# Patient Record
Sex: FEMALE | Race: BLACK OR AFRICAN AMERICAN | URBAN - METROPOLITAN AREA
[De-identification: names, ages, dates, MRNs, and addresses within clinical notes are randomized per-mention and may not be internally consistent; named-entity substitution may affect disease eponyms.]

---

## 2020-10-20 ENCOUNTER — OFFICE VISIT (OUTPATIENT)
Dept: HEMATOLOGY | Age: 60
End: 2020-10-20
Payer: COMMERCIAL

## 2020-10-20 VITALS
SYSTOLIC BLOOD PRESSURE: 135 MMHG | OXYGEN SATURATION: 98 % | HEART RATE: 82 BPM | BODY MASS INDEX: 37.8 KG/M2 | HEIGHT: 68 IN | DIASTOLIC BLOOD PRESSURE: 83 MMHG | RESPIRATION RATE: 16 BRPM | WEIGHT: 249.4 LBS | TEMPERATURE: 97.3 F

## 2020-10-20 DIAGNOSIS — E66.01 SEVERE OBESITY (HCC): ICD-10-CM

## 2020-10-20 DIAGNOSIS — R74.8 ABNORMAL LIVER ENZYMES: Primary | ICD-10-CM

## 2020-10-20 PROBLEM — E28.2 PCOS (POLYCYSTIC OVARIAN SYNDROME): Status: ACTIVE | Noted: 2020-10-20

## 2020-10-20 PROBLEM — M54.16 LUMBAR RADICULOPATHY: Status: ACTIVE | Noted: 2020-10-20

## 2020-10-20 PROBLEM — Z90.49 HISTORY OF APPENDECTOMY: Status: ACTIVE | Noted: 2020-10-20

## 2020-10-20 PROBLEM — I89.0 LYMPHATIC EDEMA: Status: ACTIVE | Noted: 2020-10-20

## 2020-10-20 PROBLEM — E66.9 OBESITY: Status: ACTIVE | Noted: 2020-10-20

## 2020-10-20 PROBLEM — J30.9 ALLERGIC RHINITIS: Status: ACTIVE | Noted: 2020-10-20

## 2020-10-20 PROBLEM — I10 ESSENTIAL HYPERTENSION: Status: ACTIVE | Noted: 2020-10-20

## 2020-10-20 PROBLEM — Z98.84 H/O GASTRIC BYPASS: Status: ACTIVE | Noted: 2020-10-20

## 2020-10-20 LAB — FERRITIN SERPL-MCNC: 49 NG/ML (ref 8–252)

## 2020-10-20 PROCEDURE — 99204 OFFICE O/P NEW MOD 45 MIN: CPT | Performed by: HOSPITALIST

## 2020-10-20 RX ORDER — DICLOFENAC SODIUM 50 MG/1
TABLET, DELAYED RELEASE ORAL
COMMUNITY

## 2020-10-20 RX ORDER — FLUTICASONE PROPIONATE 50 MCG
SPRAY, SUSPENSION (ML) NASAL
COMMUNITY

## 2020-10-20 RX ORDER — GLIMEPIRIDE 4 MG/1
TABLET ORAL
COMMUNITY

## 2020-10-20 RX ORDER — METFORMIN HYDROCHLORIDE 500 MG/1
TABLET, FILM COATED, EXTENDED RELEASE ORAL
COMMUNITY

## 2020-10-20 RX ORDER — FUROSEMIDE 20 MG/1
20 TABLET ORAL DAILY
COMMUNITY
Start: 2020-08-31

## 2020-10-20 RX ORDER — LABETALOL 100 MG/1
TABLET, FILM COATED ORAL
COMMUNITY
Start: 2020-08-03

## 2020-10-20 RX ORDER — EMPAGLIFLOZIN AND LINAGLIPTIN 10; 5 MG/1; MG/1
TABLET, FILM COATED ORAL
COMMUNITY
Start: 2020-08-31

## 2020-10-20 RX ORDER — GABAPENTIN 100 MG/1
CAPSULE ORAL
COMMUNITY
Start: 2020-10-13

## 2020-10-20 NOTE — PROGRESS NOTES
Identified pt with two pt identifiers(name and ). Reviewed record in preparation for visit and have obtained necessary documentation. Chief Complaint   Patient presents with    New Patient    Elevated Liver Enzymes      Vitals:    10/20/20 1254   BP: 135/83   Pulse: 82   Resp: 16   Temp: 97.3 °F (36.3 °C)   TempSrc: Temporal   SpO2: 98%   Weight: 249 lb 6.4 oz (113.1 kg)   Height: 5' 8\" (1.727 m)   PainSc:   5   PainLoc: Neck       Health Maintenance Review: Patient reminded of \"due or due soon\" health maintenance. I have asked the patient to contact his/her primary care provider (PCP) for follow-up on his/her health maintenance. Coordination of Care Questionnaire:  :   1) Have you been to an emergency room, urgent care, or hospitalized since your last visit? If yes, where when, and reason for visit? no       2. Have seen or consulted any other health care provider since your last visit? If yes, where when, and reason for visit? NO      Patient is accompanied by self I have received verbal consent from Reggy Showers to discuss any/all medical information while they are present in the room.

## 2020-10-20 NOTE — LETTER
10/26/20 Patient: Ann Sanchez YOB: 1960 Date of Visit: 10/20/2020 Dolores Peña MD 
[facility] [address] [address] VIA Facsimile: [phone] Neelima Vang MD 
[address] [place] [address] Drive [address] VIA In Basket Dear MD Neelima Ryder MD, Thank you for referring Ms. Annika Trivedi to [address] for evaluation. My notes for this consultation are attached. If you have questions, please do not hesitate to call me. I look forward to following your patient along with you. Sincerely, Jaye Virk MD

## 2020-10-20 NOTE — PROGRESS NOTES
Kasandra Everettr [address]      Re Cortez MD, Ebony Zepeda, Domenico Art MD, MPH      BRIDGET Gipson-RUBIA Islas, ACN-BC     Myah Aguilera, [facility]   Mel Huddleston, FNP-C    Merrillmoyas Alvarez, [facility]       Antionette [address]    at [address], [facility] Frankie Sol  22.    [phone]    FAX: [address] [place] [address], [address]    [phone]    FAX: [phone]       Patient Care Team:  Mirlande Dumont MD as PCP - General (Family Medicine)      Problem List  Date Reviewed: 10/20/2020          Codes Class Noted    Obesity ICD-10-CM: E66.9  ICD-9-CM: 278.00  10/20/2020        Allergic rhinitis ICD-10-CM: J30.9  ICD-9-CM: 477.9  10/20/2020        PCOS (polycystic ovarian syndrome) ICD-10-CM: E28.2  ICD-9-CM: 256.4  10/20/2020        Type II diabetes mellitus, uncontrolled ([address] ICD-10-CM: E11.65  ICD-9-CM: 250.02  10/20/2020        H/O gastric bypass ICD-10-CM: Z98.84  ICD-9-CM: V45.86  10/20/2020        History of appendectomy ICD-10-CM: Z90.49  ICD-9-CM: V45.89  10/20/2020        Essential hypertension ICD-10-CM: I10  ICD-9-CM: 401.9  10/20/2020        Abnormal liver enzymes ICD-10-CM: R74.8  ICD-9-CM: 790.5  10/20/2020        Lymphatic edema ICD-10-CM: I89.0  ICD-9-CM: 457.1  10/20/2020              The clinicians listed above have asked me to see Reggy Showers in consultation regarding elevated liver enzymes and its management. All medical records sent by the referring physicians were reviewed including imaging studies     The patient is a 63 y.o. black female who was told she had elevated liver enzymes several years ago as per patient. She states liver enzymes have intermittently remained elevated.   LFT's performed in 09/2020 showed AST 50, , ALP 92,  Total bili 0.6, Albumin 3.6, Total protein 7.1,     Serologic evaluation for markers of chronic liver disease was negative for acute hepatitis panel. Liver ultrasound performed in 09/2020 but result showed diffuse abnormal echotexture suugestive of fatty infiltration      An assessment of liver fibrosis with biopsy or elastography has not been performed. The patient had not started any new medications within 3 months preceding the elevation in liver chemistries. She reports she is on tumeric, magnesium and omega H3 fish oil. Patient is diabetic. Most recent HbA1c was 7.7    Patient currently reports fatigue    The patient is not currently experiencing  shortness of breath, pain in the right side over the liver, diffuse abdominal pain, yellowing of the eyes or skin, problems concentrating,   swelling of the abdomen, swelling of the lower extremities, hematemesis, and hematochezia. The patient completes all daily activities without any functional limitations. ASSESSMENT AND PLAN:  Elevated liver enzymes  Persistent elevation in liver transaminases of unclear etiology at this time. Liver transaminases are elevated. Liver function is normal.  The platelet count is normal.    Based upon laboratory studies and imaging, the patient does not appear to have advanced liver diease or cirrhosis. Will perform additional serologic tests to screen for other causes of chronic liver disease. The most likely causes for the liver chemistry abnormalities were discussed with the patient and include fatty liver disease,   The need to perform an assessment of liver fibrosis was discussed with the patient. The Fibroscan can assess liver fibrosis and determine if a patient has advanced fibrosis or cirrhosis without the need for liver biopsy. This will be performed at the next office visit.     If the liver enzymes remain persistently elevated without explanation over 1-2 years then a liver biopsy should be considered. NAFLD  Suspect the patient has fatty liver based upon imaging, features of metabolic syndrome, serologic studies that are negative for other causes of chronic liver disease,   Only a liver biopsy can confirm if the patient does have fatty liver and differentiate NAFL from SOSA. The treatment is the same; weight reduction. If the liver biopsy demonstrates SOSA the patient could be eligible for enrollment into clinical trials for treatment of SOSA. If the liver enzymes remain persistently elevated over the next 1-2 years a liver biopsy should be performed to ensure there is no ongoing chronic liver disease. If the patient looses 20% of current body weight, which is 50 pounds, down to a weight of 199 pounds, all steatosis will have resolved. Once all steatosis has resolved all inflammation will resolve. Then all fibrosis will gradually resolve and the liver could eventually be normal.  There is currently no FDA approved medical treatment for fatty liver, NALFD or SOSA. The only medical treatments for SOSA are though clinical trials. Counseling for diet and weight loss in patients with confirmed or suspected NAFLD  The patient was counseled regarding diet and exercise to achieve weight loss. The best diet for patients with fatty liver is the mediterranean diet. The patient was told not to consume any food products and drinks containing fructose as this enhances hepatic fat synthesis. Screening for Hepatocellular Carcinoma  HCC screening is not necessary if the patient has no evidence of cirrhosis. Treatment of other medical problems in patients with chronic liver disease  There are no contraindications for the patient to take most medications that are necessary for treatment of other medical issues.     Counseling for alcohol in patients with chronic liver disease  The patient was counseled regarding alcohol consumption and the effect of alcohol on chronic liver disease. The patient has cirrhosis and was advised to be abstinent from all alcohol including non-alcoholic beer which does contain some alcohol. Vaccinations   The need for vaccination against viral hepatitis A and B will be assessed with serologic and instituted as appropriate. Routine vaccinations against other bacterial and viral agents can be performed as indicated. Annual flu vaccination should be administered if indicated. ALLERGIES  Allergies   Allergen Reactions    Shellfish Derived Anaphylaxis       MEDICATIONS  Current Outpatient Medications   Medication Sig    metFORMIN (GLUMETZA ER) 500 mg TG24 24 hour tablet metformin  mg tablet,extended release 24 hr    Glyxambi 10-5 mg tab     fluticasone propionate (FLONASE) 50 mcg/actuation nasal spray fluticasone propionate 50 mcg/actuation nasal spray,suspension    furosemide (LASIX) 20 mg tablet 20 mg daily.  glimepiride (AMARYL) 4 mg tablet glimepiride 4 mg tablet    labetaloL (NORMODYNE) 100 mg tablet TAKE 1 TABLET BY MOUTH EVERY DAY    gabapentin (NEURONTIN) 100 mg capsule     diclofenac EC (VOLTAREN) 50 mg EC tablet diclofenac sodium 50 mg tablet,delayed release     No current facility-administered medications for this visit. SYSTEM REVIEW NOT RELATED TO LIVER DISEASE OR REVIEWED ABOVE:  Constitution systems: Negative for fever, chills, weight gain, weight loss. Eyes: Negative for visual changes. ENT: Negative for sore throat, painful swallowing. Respiratory: Negative for cough, hemoptysis, SOB. Cardiology: Negative for chest pain, palpitations. GI:  Negative for constipation or diarrhea. : Negative for urinary frequency, dysuria, hematuria, nocturia. Skin: Negative for rash. Hematology: Negative for easy bruising, blood clots. Musculo-skelatal: Negative for back pain, muscle pain, weakness.   Neurologic: Negative for headaches, dizziness, vertigo, memory problems not related to HE.  Psychology: Negative for anxiety, depression. FAMILY HISTORY:  The father   Has/had the following following chronic disease(s): Multiple Myeloma  The mother   Has/had the following chronic disease(s): CHF, CAD, Afib  Brother  of liver cancer. .  There is no family history of immune disorders. SOCIAL HISTORY:  The patient is . The patient has no children. The patient has never used tobacco products. The patient consumes alcohol on social occasions never in excess. The patient currently works full time as  with [employer]. PHYSICAL EXAMINATION:  Visit Vitals  /83 (BP 1 Location: Right arm, BP Patient Position: Sitting)   Pulse 82   Temp 97.3 °F (36.3 °C) (Temporal)   Resp 16   Ht 5' 8\" (1.727 m)   Wt 113.1 kg (249 lb 6.4 oz)   SpO2 98%   BMI 37.92 kg/m²     General: No acute distress. Eyes: Sclera anicteric. ENT: No oral lesions. Thyroid normal.  Nodes: No adenopathy. Skin: No spider angiomata. No jaundice. No palmar erythema. Respiratory: Lungs clear to auscultation. Cardiovascular: Regular heart rate. No murmurs. No JVD. Abdomen: Soft non-tender. Liver size normal to percussion/palpation. Spleen not palpable. No obvious ascites. Extremities: No edema. No muscle wasting. No gross arthritic changes. Neurologic: Alert and oriented. Cranial nerves grossly intact. No asterixis. LABORATORY STUDIES:  Recent liver function panel, CBC with platelet count and BMP are not available. These studies will be performed. 2020  AST 50,  ,   ALP 92,    Total bili 0.6, Albumin 3.6, Total protein 7.1,     SEROLOGIES:  Not available or performed. Testing was performed today.   Serologies Latest Ref Rng & Units 10/20/2020   Hep A Ab, Total Negative   Negative   Hep B Core Ab, Total Negative   Negative   Hep B Surface Ab mIU/mL <3.10   Hep B Surface Ab Interp NONREACTIVE   NONREACTIVE   Ferritin 8 - 252 NG/ML 49   Iron % Saturation 20 - 50 % 29   ASMCA 0 - 19 Units 6   Alpha-1 antitrypsin level 101 - 187 mg/dL 117       LIVER HISTOLOGY:  Not available or performed    ENDOSCOPIC PROCEDURES:  Not available or performed    RADIOLOGY:  09/20: Liver US: Diffuse abnormal echotexture suggestive of fatty infiltration    OTHER TESTING:  Not available or performed    FOLLOW-UP:  All of the issues listed above in the Assessment and Plan were discussed with the patient. All questions were answered. The patient expressed a clear understanding of the above. [address] [place] [address] in 4 weeks for Fibroscan and to review all data and determine the treatment plan.     Floyd Howard MD, MPH  Advanced Hepatology  [facility] of [address] [address] Kenn Lieberman, [address] [place] [address] 22.  [phone]  [address] [place] [address]

## 2020-10-21 LAB
HAV AB SER QL IA: NEGATIVE
HBV CORE AB SERPL QL IA: NEGATIVE
HBV SURFACE AB SER QL: NONREACTIVE
HBV SURFACE AB SER-ACNC: <3.1 MIU/ML
IRON SATN MFR SERPL: 29 % (ref 20–50)
IRON SERPL-MCNC: 103 UG/DL (ref 35–150)
TIBC SERPL-MCNC: 357 UG/DL (ref 250–450)

## 2020-10-22 LAB
A1AT SERPL-MCNC: 117 MG/DL (ref 101–187)
ACTIN IGG SERPL-ACNC: 6 UNITS (ref 0–19)

## 2020-10-25 PROBLEM — E66.01 SEVERE OBESITY (HCC): Status: ACTIVE | Noted: 2020-10-25

## 2020-10-25 LAB — ANA TITR SER IF: NEGATIVE {TITER}

## 2022-03-18 PROBLEM — I89.0 LYMPHATIC EDEMA: Status: ACTIVE | Noted: 2020-10-20

## 2022-03-19 PROBLEM — Z98.84 H/O GASTRIC BYPASS: Status: ACTIVE | Noted: 2020-10-20

## 2022-03-19 PROBLEM — E66.9 OBESITY: Status: ACTIVE | Noted: 2020-10-20

## 2022-03-19 PROBLEM — I10 ESSENTIAL HYPERTENSION: Status: ACTIVE | Noted: 2020-10-20

## 2022-03-19 PROBLEM — E28.2 PCOS (POLYCYSTIC OVARIAN SYNDROME): Status: ACTIVE | Noted: 2020-10-20

## 2022-03-19 PROBLEM — Z90.49 HISTORY OF APPENDECTOMY: Status: ACTIVE | Noted: 2020-10-20

## 2022-03-19 PROBLEM — E66.01 SEVERE OBESITY (HCC): Status: ACTIVE | Noted: 2020-10-25

## 2022-03-19 PROBLEM — J30.9 ALLERGIC RHINITIS: Status: ACTIVE | Noted: 2020-10-20

## 2022-03-20 PROBLEM — R74.8 ABNORMAL LIVER ENZYMES: Status: ACTIVE | Noted: 2020-10-20

## 2022-05-19 NOTE — PATIENT INSTRUCTIONS
We will perform blood work today We will bring you back in 4 weeks for fibroscan Recommend weight loss at least 50 lbs. Return to clinic in 4 weeks Learning About the [address] Diet What is the Mediterranean diet? The Mediterranean diet is a style of eating rather than a diet plan. It features foods eaten in [place], [place], [place] and [place], and other countries along the [facility]. It emphasizes eating foods like fish, fruits, vegetables, beans, high-fiber breads and whole grains, nuts, and olive oil. This style of eating includes limited red meat, cheese, and sweets. Why choose the Mediterranean diet? A Mediterranean-style diet may improve heart health. It contains more fat than other heart-healthy diets. But the fats are mainly from nuts, unsaturated oils (such as fish oils and olive oil), and certain nut or seed oils (such as canola, soybean, or flaxseed oil). These fats may help protect the heart and blood vessels. How can you get started on the Mediterranean diet? Here are some things you can do to switch to a more Mediterranean way of eating. What to eat · Eat a variety of fruits and vegetables each day, such as grapes, blueberries, tomatoes, broccoli, peppers, figs, olives, spinach, eggplant, beans, lentils, and chickpeas. · Eat a variety of whole-grain foods each day, such as oats, brown rice, and whole wheat bread, pasta, and couscous. · Eat fish at least 2 times a week. Try tuna, salmon, mackerel, lake trout, herring, or sardines. · Eat moderate amounts of low-fat dairy products, such as milk, cheese, or yogurt. · Eat moderate amounts of poultry and eggs. · Choose healthy (unsaturated) fats, such as nuts, olive oil, and certain nut or seed oils like canola, soybean, and flaxseed. · Limit unhealthy (saturated) fats, such as butter, palm oil, and coconut oil.  And limit fats found in animal products, such as meat and dairy products made with whole milk. Try to eat red meat only a few times a month in very small amounts. · Limit sweets and desserts to only a few times a week. This includes sugar-sweetened drinks like soda. The Mediterranean diet may also include red wine with your meal1 glass each day for women and up to 2 glasses a day for men. Tips for eating at home · Use herbs, spices, garlic, lemon zest, and citrus juice instead of salt to add flavor to foods. · Add avocado slices to your sandwich instead of krueger. · Have fish for lunch or dinner instead of red meat. Brush the fish with olive oil, and broil or grill it. · Sprinkle your salad with seeds or nuts instead of cheese. · Cook with olive or canola oil instead of butter or oils that are high in saturated fat. · Switch from 2% milk or whole milk to 1% or fat-free milk. · Dip raw vegetables in a vinaigrette dressing or hummus instead of dips made from mayonnaise or sour cream. 
· Have a piece of fruit for dessert instead of a piece of cake. Try baked apples, or have some dried fruit. Tips for eating out · Try broiled, grilled, baked, or poached fish instead of having it fried or breaded. · Ask your  to have your meals prepared with olive oil instead of butter. · Order dishes made with marinara sauce or sauces made from olive oil. Avoid sauces made from cream or mayonnaise. · Choose whole-grain breads, whole wheat pasta and pizza crust, brown rice, beans, and lentils. · Cut back on butter or margarine on bread. Instead, you can dip your bread in a small amount of olive oil. · Ask for a side salad or grilled vegetables instead of french fries or chips. Where can you learn more? Go to http://www.gray.com/ Enter 199-678-7664 in the search box to learn more about \"Learning About the Mediterranean Diet. \" Current as of: August 22, 2019               Content Version: 12.6 © 5651-7850 [employer]. Care instructions adapted under license by [employer] (which disclaims liability or warranty for this information). If you have questions about a medical condition or this instruction, always ask your healthcare professional. [address] any warranty or liability for your use of this information. 19-May-2022 14:03

## 2023-05-06 ENCOUNTER — HOSPITAL ENCOUNTER (EMERGENCY)
Facility: HOSPITAL | Age: 63
Discharge: HOME | End: 2023-05-06
Attending: STUDENT IN AN ORGANIZED HEALTH CARE EDUCATION/TRAINING PROGRAM
Payer: COMMERCIAL

## 2023-05-06 ENCOUNTER — APPOINTMENT (OUTPATIENT)
Facility: HOSPITAL | Age: 63
End: 2023-05-06
Payer: COMMERCIAL

## 2023-05-06 VITALS
HEART RATE: 68 BPM | DIASTOLIC BLOOD PRESSURE: 89 MMHG | RESPIRATION RATE: 18 BRPM | HEIGHT: 68 IN | WEIGHT: 223 LBS | TEMPERATURE: 98.7 F | BODY MASS INDEX: 33.8 KG/M2 | OXYGEN SATURATION: 100 % | SYSTOLIC BLOOD PRESSURE: 147 MMHG

## 2023-05-06 DIAGNOSIS — M25.531 RIGHT WRIST PAIN: Primary | ICD-10-CM

## 2023-05-06 PROCEDURE — 73110 X-RAY EXAM OF WRIST: CPT

## 2023-05-06 PROCEDURE — 99283 EMERGENCY DEPT VISIT LOW MDM: CPT

## 2023-05-06 PROCEDURE — 29125 APPL SHORT ARM SPLINT STATIC: CPT

## 2023-05-06 ASSESSMENT — LIFESTYLE VARIABLES
HOW OFTEN DO YOU HAVE A DRINK CONTAINING ALCOHOL: NEVER
HOW MANY STANDARD DRINKS CONTAINING ALCOHOL DO YOU HAVE ON A TYPICAL DAY: PATIENT DOES NOT DRINK
HOW OFTEN DO YOU HAVE A DRINK CONTAINING ALCOHOL: NEVER

## 2023-05-06 ASSESSMENT — PAIN SCALES - GENERAL: PAINLEVEL_OUTOF10: 7

## 2023-05-06 NOTE — ED PROVIDER NOTES
[facility] EMERGENCY DEPARTMENT  EMERGENCY DEPARTMENT HISTORY AND PHYSICAL EXAM      Date: 5/6/2023  Patient Name: Benjamín Iglesias  MRN: [id number]  YOB: 1960  Date of evaluation: 5/6/2023  Provider: Darvin Parks MD   Note Started: 8:40 AM 5/6/23    HISTORY OF PRESENT ILLNESS     Chief Complaint   Patient presents with    Wrist Injury    Hip Pain       History Provided By: Patient    HPI: Benjamín Iglesias, 60 y.o. female presenting to the ED for right wrist pain. Patient states about 8 days ago she was getting out of her car and walking along a curb when she slipped and fell and injured her right wrist.  She believes she hit an outstretched hand but is unsure of the mechanism exactly. She notes she has been delayed come to the ED because she thought the pain would improve but it has continued. She notes pain in the right wrist and extending to the right thumb. She denies any numbness or tingling. She does report some pain to the hip since the injury but she reports it is improved and her main reason for coming to the ED today is the wrist.     PAST MEDICAL HISTORY   Past Medical History:  History reviewed. No pertinent past medical history. Past Surgical History:  History reviewed. No pertinent surgical history. Family History:  History reviewed. No pertinent family history. Social History:  Social History     Tobacco Use    Smoking status: Never    Smokeless tobacco: Never   Substance Use Topics    Alcohol use: Not Currently    Drug use: Never       Allergies:  No Known Allergies    PCP: Anika Amaya MD    Current Meds:   Previous Medications    No medications on file       REVIEW OF SYSTEMS     Positives and Pertinent negatives as per HPI. PHYSICAL EXAM   [unfilled]   Physical Exam  Vitals and nursing note reviewed. HENT:      Nose: Nose normal.      Mouth/Throat:      Mouth: Mucous membranes are moist.   Cardiovascular:      Pulses: Normal pulses.    Pulmonary:

## 2023-05-30 RX ORDER — EMPAGLIFLOZIN AND LINAGLIPTIN 10; 5 MG/1; MG/1
TABLET, FILM COATED ORAL
COMMUNITY
Start: 2020-08-31

## 2023-05-30 RX ORDER — LABETALOL 100 MG/1
1 TABLET, FILM COATED ORAL DAILY
COMMUNITY
Start: 2020-08-03

## 2023-05-30 RX ORDER — GLIMEPIRIDE 4 MG/1
TABLET ORAL
COMMUNITY

## 2023-05-30 RX ORDER — GABAPENTIN 100 MG/1
CAPSULE ORAL
COMMUNITY
Start: 2020-10-13

## 2023-05-30 RX ORDER — FLUTICASONE PROPIONATE 50 MCG
SPRAY, SUSPENSION (ML) NASAL
COMMUNITY

## 2023-05-30 RX ORDER — METFORMIN HYDROCHLORIDE 500 MG/1
TABLET, FILM COATED, EXTENDED RELEASE ORAL
COMMUNITY

## 2023-05-30 RX ORDER — FUROSEMIDE 20 MG/1
TABLET ORAL DAILY
COMMUNITY
Start: 2020-08-31

## 2023-10-05 ENCOUNTER — HOSPITAL ENCOUNTER (EMERGENCY)
Facility: HOSPITAL | Age: 63
Discharge: HOME | End: 2023-10-05
Payer: COMMERCIAL

## 2023-10-05 VITALS
OXYGEN SATURATION: 96 % | HEART RATE: 85 BPM | TEMPERATURE: 98.2 F | DIASTOLIC BLOOD PRESSURE: 81 MMHG | HEIGHT: 68 IN | WEIGHT: 240 LBS | RESPIRATION RATE: 17 BRPM | SYSTOLIC BLOOD PRESSURE: 146 MMHG | BODY MASS INDEX: 36.37 KG/M2

## 2023-10-05 DIAGNOSIS — R73.09 ELEVATED RANDOM BLOOD GLUCOSE LEVEL: ICD-10-CM

## 2023-10-05 DIAGNOSIS — N30.01 ACUTE CYSTITIS WITH HEMATURIA: ICD-10-CM

## 2023-10-05 DIAGNOSIS — R60.0 BILATERAL LOWER EXTREMITY EDEMA: Primary | ICD-10-CM

## 2023-10-05 LAB
ALBUMIN SERPL-MCNC: 3.1 G/DL (ref 3.5–5)
ALBUMIN/GLOB SERPL: 0.9 (ref 1.1–2.2)
ALP SERPL-CCNC: 125 U/L (ref 45–117)
ALT SERPL-CCNC: 23 U/L (ref 12–78)
ANION GAP SERPL CALC-SCNC: 7 MMOL/L (ref 5–15)
APPEARANCE UR: ABNORMAL
AST SERPL W P-5'-P-CCNC: 14 U/L (ref 15–37)
BACTERIA URNS QL MICRO: ABNORMAL /HPF
BASOPHILS # BLD: 0 K/UL (ref 0–0.1)
BASOPHILS NFR BLD: 0 % (ref 0–1)
BILIRUB SERPL-MCNC: 0.5 MG/DL (ref 0.2–1)
BILIRUB UR QL: NEGATIVE
BNP SERPL-MCNC: 27 PG/ML
BUN SERPL-MCNC: 12 MG/DL (ref 6–20)
BUN/CREAT SERPL: 18 (ref 12–20)
CA-I BLD-MCNC: 8.7 MG/DL (ref 8.5–10.1)
CHLORIDE SERPL-SCNC: 102 MMOL/L (ref 97–108)
CO2 SERPL-SCNC: 29 MMOL/L (ref 21–32)
COLOR UR: YELLOW
CREAT SERPL-MCNC: 0.65 MG/DL (ref 0.55–1.02)
DIFFERENTIAL METHOD BLD: ABNORMAL
EOSINOPHIL # BLD: 0.2 K/UL (ref 0–0.4)
EOSINOPHIL NFR BLD: 4 % (ref 0–7)
EPITH CASTS URNS QL MICRO: ABNORMAL /LPF
ERYTHROCYTE [DISTWIDTH] IN BLOOD BY AUTOMATED COUNT: 13.4 % (ref 11.5–14.5)
GLOBULIN SER CALC-MCNC: 3.4 G/DL (ref 2–4)
GLUCOSE SERPL-MCNC: 303 MG/DL (ref 65–100)
GLUCOSE UR STRIP.AUTO-MCNC: >1000 MG/DL
HCT VFR BLD AUTO: 40.5 % (ref 35–47)
HGB BLD-MCNC: 12.5 G/DL (ref 11.5–16)
HGB UR QL STRIP: ABNORMAL
IMM GRANULOCYTES # BLD AUTO: 0 K/UL (ref 0–0.04)
IMM GRANULOCYTES NFR BLD AUTO: 0 % (ref 0–0.5)
KETONES UR QL STRIP.AUTO: NEGATIVE MG/DL
LEUKOCYTE ESTERASE UR QL STRIP.AUTO: ABNORMAL
LYMPHOCYTES # BLD: 1.3 K/UL (ref 0.8–3.5)
LYMPHOCYTES NFR BLD: 23 % (ref 12–49)
MCH RBC QN AUTO: 25.8 PG (ref 26–34)
MCHC RBC AUTO-ENTMCNC: 30.9 G/DL (ref 30–36.5)
MCV RBC AUTO: 83.7 FL (ref 80–99)
MONOCYTES # BLD: 0.6 K/UL (ref 0–1)
MONOCYTES NFR BLD: 10 % (ref 5–13)
NEUTS SEG # BLD: 3.4 K/UL (ref 1.8–8)
NEUTS SEG NFR BLD: 63 % (ref 32–75)
NITRITE UR QL STRIP.AUTO: POSITIVE
PH UR STRIP: 5 (ref 5–8)
PLATELET # BLD AUTO: 192 K/UL (ref 150–400)
PMV BLD AUTO: 10.3 FL (ref 8.9–12.9)
POTASSIUM SERPL-SCNC: 3.9 MMOL/L (ref 3.5–5.1)
PROT SERPL-MCNC: 6.5 G/DL (ref 6.4–8.2)
PROT UR STRIP-MCNC: NEGATIVE MG/DL
RBC # BLD AUTO: 4.84 M/UL (ref 3.8–5.2)
RBC #/AREA URNS HPF: ABNORMAL /HPF (ref 0–5)
SODIUM SERPL-SCNC: 138 MMOL/L (ref 136–145)
SP GR UR REFRACTOMETRY: 1.02 (ref 1–1.03)
URINE CULTURE IF INDICATED: ABNORMAL
UROBILINOGEN UR QL STRIP.AUTO: 0.1 EU/DL (ref 0.2–1)
WBC # BLD AUTO: 5.5 K/UL (ref 3.6–11)
WBC URNS QL MICRO: ABNORMAL /HPF (ref 0–4)

## 2023-10-05 PROCEDURE — 87077 CULTURE AEROBIC IDENTIFY: CPT

## 2023-10-05 PROCEDURE — 36415 COLL VENOUS BLD VENIPUNCTURE: CPT

## 2023-10-05 PROCEDURE — 80053 COMPREHEN METABOLIC PANEL: CPT

## 2023-10-05 PROCEDURE — 85025 COMPLETE CBC W/AUTO DIFF WBC: CPT

## 2023-10-05 PROCEDURE — 99283 EMERGENCY DEPT VISIT LOW MDM: CPT

## 2023-10-05 PROCEDURE — 87186 SC STD MICRODIL/AGAR DIL: CPT

## 2023-10-05 PROCEDURE — 81001 URINALYSIS AUTO W/SCOPE: CPT

## 2023-10-05 PROCEDURE — 83880 ASSAY OF NATRIURETIC PEPTIDE: CPT

## 2023-10-05 PROCEDURE — 87086 URINE CULTURE/COLONY COUNT: CPT

## 2023-10-05 RX ORDER — CEPHALEXIN 500 MG/1
500 CAPSULE ORAL 4 TIMES DAILY
Qty: 28 CAPSULE | Refills: 0 | Status: SHIPPED | OUTPATIENT
Start: 2023-10-05 | End: 2023-10-12

## 2023-10-05 RX ORDER — EMPAGLIFLOZIN AND LINAGLIPTIN 10; 5 MG/1; MG/1
10 TABLET, FILM COATED ORAL DAILY
Qty: 30 TABLET | Refills: 0 | Status: SHIPPED | OUTPATIENT
Start: 2023-10-05

## 2023-10-05 ASSESSMENT — PAIN SCALES - GENERAL: PAINLEVEL_OUTOF10: 8

## 2023-10-05 ASSESSMENT — LIFESTYLE VARIABLES
HOW MANY STANDARD DRINKS CONTAINING ALCOHOL DO YOU HAVE ON A TYPICAL DAY: PATIENT DOES NOT DRINK
HOW OFTEN DO YOU HAVE A DRINK CONTAINING ALCOHOL: NEVER

## 2023-10-05 ASSESSMENT — PAIN - FUNCTIONAL ASSESSMENT: PAIN_FUNCTIONAL_ASSESSMENT: 0-10

## 2023-10-05 NOTE — ED PROVIDER NOTES
Judgment: Judgment normal.           SCREENINGS                  LAB, EKG AND DIAGNOSTIC RESULTS   Labs:  Recent Results (from the past 12 hour(s))   CMP    Collection Time: 10/05/23  4:15 PM   Result Value Ref Range    Sodium 138 136 - 145 mmol/L    Potassium 3.9 3.5 - 5.1 mmol/L    Chloride 102 97 - 108 mmol/L    CO2 29 21 - 32 mmol/L    Anion Gap 7 5 - 15 mmol/L    Glucose 303 (H) 65 - 100 mg/dL    BUN 12 6 - 20 mg/dL    Creatinine 0.65 0.55 - 1.02 mg/dL    Bun/Cre Ratio 18 12 - 20      Est, Glom Filt Rate >60 >60 ml/min/1.73m2    Calcium 8.7 8.5 - 10.1 mg/dL    Total Bilirubin 0.5 0.2 - 1.0 mg/dL    AST 14 (L) 15 - 37 U/L    ALT 23 12 - 78 U/L    Alk Phosphatase 125 (H) 45 - 117 U/L    Total Protein 6.5 6.4 - 8.2 g/dL    Albumin 3.1 (L) 3.5 - 5.0 g/dL    Globulin 3.4 2.0 - 4.0 g/dL    Albumin/Globulin Ratio 0.9 (L) 1.1 - 2.2     CBC with Auto Differential    Collection Time: 10/05/23  4:15 PM   Result Value Ref Range    WBC 5.5 3.6 - 11.0 K/uL    RBC 4.84 3.80 - 5.20 M/uL    Hemoglobin 12.5 11.5 - 16.0 g/dL    Hematocrit 40.5 35.0 - 47.0 %    MCV 83.7 80.0 - 99.0 FL    MCH 25.8 (L) 26.0 - 34.0 PG    MCHC 30.9 30.0 - 36.5 g/dL    RDW 13.4 11.5 - 14.5 %    Platelets 905 828 - 848 K/uL    MPV 10.3 8.9 - 12.9 FL    Neutrophils % 63 32 - 75 %    Lymphocytes % 23 12 - 49 %    Monocytes % 10 5 - 13 %    Eosinophils % 4 0 - 7 %    Basophils % 0 0 - 1 %    Immature Granulocytes 0 0.0 - 0.5 %    Neutrophils Absolute 3.4 1.8 - 8.0 K/UL    Lymphocytes Absolute 1.3 0.8 - 3.5 K/UL    Monocytes Absolute 0.6 0.0 - 1.0 K/UL    Eosinophils Absolute 0.2 0.0 - 0.4 K/UL    Basophils Absolute 0.0 0.0 - 0.1 K/UL    Absolute Immature Granulocyte 0.0 0.00 - 0.04 K/UL    Differential Type AUTOMATED     Brain Natriuretic Peptide    Collection Time: 10/05/23  4:15 PM   Result Value Ref Range    NT Pro-BNP 27 <125 pg/mL   Urinalysis with Reflex to Culture    Collection Time: 10/05/23  4:45 PM    Specimen: Urine   Result Value Ref Range

## 2023-10-08 LAB
BACTERIA SPEC CULT: ABNORMAL
COLONY COUNT, CNT: ABNORMAL
Lab: ABNORMAL

## 2023-10-23 LAB — HBA1C MFR BLD HPLC: 9 %

## 2024-01-29 ENCOUNTER — OFFICE VISIT (OUTPATIENT)
Facility: CLINIC | Age: 64
End: 2024-01-29
Payer: COMMERCIAL

## 2024-01-29 VITALS
RESPIRATION RATE: 20 BRPM | SYSTOLIC BLOOD PRESSURE: 138 MMHG | TEMPERATURE: 97.7 F | BODY MASS INDEX: 35.46 KG/M2 | WEIGHT: 234 LBS | HEART RATE: 73 BPM | DIASTOLIC BLOOD PRESSURE: 83 MMHG | HEIGHT: 68 IN | OXYGEN SATURATION: 97 %

## 2024-01-29 DIAGNOSIS — E66.01 CLASS 2 SEVERE OBESITY DUE TO EXCESS CALORIES WITH SERIOUS COMORBIDITY AND BODY MASS INDEX (BMI) OF 35.0 TO 35.9 IN ADULT (HCC): ICD-10-CM

## 2024-01-29 DIAGNOSIS — E11.65 CONTROLLED TYPE 2 DIABETES MELLITUS WITH HYPERGLYCEMIA, WITHOUT LONG-TERM CURRENT USE OF INSULIN (HCC): ICD-10-CM

## 2024-01-29 DIAGNOSIS — Z12.31 BREAST CANCER SCREENING BY MAMMOGRAM: ICD-10-CM

## 2024-01-29 DIAGNOSIS — Z01.419 WELL WOMAN EXAM: ICD-10-CM

## 2024-01-29 DIAGNOSIS — I10 ESSENTIAL HYPERTENSION: Primary | ICD-10-CM

## 2024-01-29 DIAGNOSIS — K75.81 STEATOHEPATITIS: ICD-10-CM

## 2024-01-29 PROBLEM — R74.8 ABNORMAL LIVER ENZYMES: Status: RESOLVED | Noted: 2020-10-20 | Resolved: 2024-01-29

## 2024-01-29 PROBLEM — E66.812 CLASS 2 SEVERE OBESITY DUE TO EXCESS CALORIES WITH SERIOUS COMORBIDITY AND BODY MASS INDEX (BMI) OF 35.0 TO 35.9 IN ADULT: Status: ACTIVE | Noted: 2020-10-25

## 2024-01-29 PROBLEM — E66.9 OBESITY: Status: RESOLVED | Noted: 2020-10-20 | Resolved: 2024-01-29

## 2024-01-29 PROCEDURE — 99204 OFFICE O/P NEW MOD 45 MIN: CPT | Performed by: FAMILY MEDICINE

## 2024-01-29 PROCEDURE — 3075F SYST BP GE 130 - 139MM HG: CPT | Performed by: FAMILY MEDICINE

## 2024-01-29 PROCEDURE — 3079F DIAST BP 80-89 MM HG: CPT | Performed by: FAMILY MEDICINE

## 2024-01-29 ASSESSMENT — PATIENT HEALTH QUESTIONNAIRE - PHQ9
SUM OF ALL RESPONSES TO PHQ9 QUESTIONS 1 & 2: 0
SUM OF ALL RESPONSES TO PHQ QUESTIONS 1-9: 0
SUM OF ALL RESPONSES TO PHQ QUESTIONS 1-9: 0
1. LITTLE INTEREST OR PLEASURE IN DOING THINGS: 0
2. FEELING DOWN, DEPRESSED OR HOPELESS: 0
SUM OF ALL RESPONSES TO PHQ QUESTIONS 1-9: 0
SUM OF ALL RESPONSES TO PHQ QUESTIONS 1-9: 0

## 2024-01-29 NOTE — PROGRESS NOTES
Family Medicine Initial Office Visit  Patient: Brandie Ahumada  1960, 63 y.o., female  Encounter Date: 1/29/2024    ASSESSMENT & PLAN  Brandie Ahumada is a 63 y.o. female who presented for established care with below concerns:    1. Essential hypertension  Overview:  BP Readings from Last 3 Encounters:   01/29/24 138/83   10/05/23 (!) 146/81   05/06/23 (!) 147/89     Medication: none  Prev Meds: labetalol, losartan, lisinopril (all caused lightheadedness despite low dosage)    Interval Hx:  - doing well, hasn't needed medications.  Assessment & Plan:   Borderline controlled, continue current treatment plan and lifestyle modifications recommended  Orders:  -     CBC; Future  -     Comprehensive Metabolic Panel; Future  2. Controlled type 2 diabetes mellitus with hyperglycemia, without long-term current use of insulin (HCC)  Overview:  No results found for: \"LABA1C\"    Medication: glimepiride 4mg daily, berberine supplement 1200mg BID, Jardiance 25mg daily (needs refill)    Fasting glucose: 165 most recently    Interval Hx:  - doing well, needs Jardiance refill.  - weakness dietary is breads.     Assessment & Plan:   Borderline controlled, changes made today: restart jardiance, watch for low glucose readings as she continues to improve lifestyle measurements. Follow up in 3 months.  Orders:  -     empagliflozin (JARDIANCE) 25 MG tablet; Take 1 tablet by mouth daily, Disp-90 tablet, R-3Normal  -     Hemoglobin A1C; Future  -     Microalbumin / Creatinine Urine Ratio; Future  3. Steatohepatitis  Overview:  Lab Results   Component Value Date/Time    ALKPHOS 125 10/05/2023 04:15 PM    ALT 23 10/05/2023 04:15 PM    AST 14 10/05/2023 04:15 PM    PROT 6.5 10/05/2023 04:15 PM    BILITOT 0.5 10/05/2023 04:15 PM    LABALBU 3.1 10/05/2023 04:15 PM     US confirmed.    Interval Hx:  -working hard on lifestyle management.  Assessment & Plan:   Unclear control, lifestyle modifications recommended  Orders:  -     Lipid

## 2024-01-29 NOTE — ASSESSMENT & PLAN NOTE
Borderline controlled, changes made today: restart jardiance, watch for low glucose readings as she continues to improve lifestyle measurements. Follow up in 3 months.

## 2024-01-31 ENCOUNTER — TELEPHONE (OUTPATIENT)
Facility: CLINIC | Age: 64
End: 2024-01-31

## 2024-02-05 NOTE — PROGRESS NOTES
Brandie Ahumada is a 63 y.o. female returns for an annual exam     No chief complaint on file.      No LMP recorded.  Her periods are absent due to menopause  She does not have dysmenorrhea.  Problems: no problems  Birth Control: post menopausal status.  Last Pap: 2019 and was normal per pt; reports never having abnormal pap smear  She does not have a history of FAMILIA 2, 3 or cervical cancer.   Last Mammogram: 10/5/2023 negative  Last colonoscopy: 11/17/2020 was normal      Examination chaperoned by Radha Fulton LPN.

## 2024-02-12 ENCOUNTER — OFFICE VISIT (OUTPATIENT)
Age: 64
End: 2024-02-12
Payer: COMMERCIAL

## 2024-02-12 VITALS — DIASTOLIC BLOOD PRESSURE: 79 MMHG | SYSTOLIC BLOOD PRESSURE: 144 MMHG | WEIGHT: 234.4 LBS | BODY MASS INDEX: 35.64 KG/M2

## 2024-02-12 DIAGNOSIS — Z01.419 ENCOUNTER FOR GYNECOLOGICAL EXAMINATION: Primary | ICD-10-CM

## 2024-02-12 DIAGNOSIS — Z11.51 SCREENING FOR HUMAN PAPILLOMAVIRUS (HPV): ICD-10-CM

## 2024-02-12 PROCEDURE — 3077F SYST BP >= 140 MM HG: CPT | Performed by: OBSTETRICS & GYNECOLOGY

## 2024-02-12 PROCEDURE — 3078F DIAST BP <80 MM HG: CPT | Performed by: OBSTETRICS & GYNECOLOGY

## 2024-02-12 PROCEDURE — 99386 PREV VISIT NEW AGE 40-64: CPT | Performed by: OBSTETRICS & GYNECOLOGY

## 2024-02-12 RX ORDER — MULTIVIT-MIN/IRON/FOLIC ACID/K 18-600-40
CAPSULE ORAL
COMMUNITY

## 2024-02-12 RX ORDER — MAGNESIUM 30 MG
30 TABLET ORAL 2 TIMES DAILY
COMMUNITY

## 2024-02-12 RX ORDER — CYANOCOBALAMIN (VITAMIN B-12) 500 MCG
TABLET ORAL
COMMUNITY

## 2024-02-12 NOTE — PROGRESS NOTES
Brandie Ahumada is a ,  63 y.o. female Black /  whose LMP was on  who presents for her annual checkup. She is having no problems.    Menstrual status:      Pt is postmenopausal    The patient is not using HRT.    Contraception:    The current method of family planning is post menopausal status.    Sexual history:    She  reports being sexually active and has had partner(s) who are male. She reports using the following method of birth control/protection: Post-menopausal.        Pap and Mammogram History:    Last Pap:  and was normal per pt; reports never having abnormal pap smear  She does not have a history of FAMILIA 2, 3 or cervical cancer.   Last Mammogram: 10/5/2023 negative  Last colonoscopy: 2020 was normal      Breast Cancer History    She has no family history of breast cancer.    Family History   Problem Relation Age of Onset    Heart Failure Mother     Diabetes Mother     Cancer Mother         Cervical    Heart Surgery Mother     Cancer Father     Diabetes Maternal Grandmother     Diabetes Paternal Grandmother     Diabetes Sister        Past Medical History:   Diagnosis Date    Acquired lymphedema of lower extremity     Diabetes mellitus (HCC)     Type 2 diabetes mellitus without complication (HCC) >30 years     Past Surgical History:   Procedure Laterality Date    APPENDECTOMY      DILATION AND CURETTAGE      Excessive bleeding    GASTRIC BYPASS SURGERY      KNEE SURGERY      LAPAROSCOPY       Current Outpatient Medications   Medication Sig Dispense Refill    magnesium 30 MG tablet Take 1 tablet by mouth 2 times daily      Omega-3 Fatty Acids (FISH OIL) 300 MG CAPS Take by mouth      Cholecalciferol (VITAMIN D) 50 MCG (2000) CAPS capsule Take by mouth      Berberine Chloride (BERBERINE HCI PO) Take by mouth      glimepiride (AMARYL) 4 MG tablet       empagliflozin (JARDIANCE) 25 MG tablet Take 1 tablet by mouth daily (Patient not taking: Reported on 2024) 90

## 2024-02-21 LAB
CYTOLOGIST CVX/VAG CYTO: ABNORMAL
CYTOLOGY CVX/VAG DOC CYTO: ABNORMAL
CYTOLOGY CVX/VAG DOC THIN PREP: ABNORMAL
DX ICD CODE: ABNORMAL
HPV GENOTYPE REFLEX: ABNORMAL
HPV I/H RISK 4 DNA CVX QL PROBE+SIG AMP: POSITIVE
HPV16 DNA CVX QL PROBE+SIG AMP: NEGATIVE
HPV18+45 E6+E7 MRNA CVX QL NAA+PROBE: NEGATIVE
Lab: ABNORMAL
Lab: ABNORMAL
OTHER STN SPEC: ABNORMAL
STAT OF ADQ CVX/VAG CYTO-IMP: ABNORMAL

## 2024-02-29 ENCOUNTER — PATIENT MESSAGE (OUTPATIENT)
Facility: CLINIC | Age: 64
End: 2024-02-29

## 2024-03-01 NOTE — TELEPHONE ENCOUNTER
From: Brandie Ahumada  To: Dr. Akil Boggs  Sent: 2/29/2024 9:03 PM EST  Subject: Jardiance too expensive >$1.4k    FYI  Controlling glucose with berberine supplement and glimepiride only.  Found effective product prescription not required. Thank you. Elena Ahumada

## 2024-04-23 DIAGNOSIS — I10 ESSENTIAL HYPERTENSION: ICD-10-CM

## 2024-04-23 DIAGNOSIS — K75.81 STEATOHEPATITIS: ICD-10-CM

## 2024-04-23 DIAGNOSIS — E11.65 CONTROLLED TYPE 2 DIABETES MELLITUS WITH HYPERGLYCEMIA, WITHOUT LONG-TERM CURRENT USE OF INSULIN (HCC): ICD-10-CM

## 2024-04-24 LAB
ALBUMIN SERPL-MCNC: 3.1 G/DL (ref 3.5–5)
ALBUMIN/GLOB SERPL: 1 (ref 1.1–2.2)
ALP SERPL-CCNC: 120 U/L (ref 45–117)
ALT SERPL-CCNC: 24 U/L (ref 12–78)
ANION GAP SERPL CALC-SCNC: 4 MMOL/L (ref 5–15)
AST SERPL-CCNC: 15 U/L (ref 15–37)
BILIRUB SERPL-MCNC: 0.4 MG/DL (ref 0.2–1)
BUN SERPL-MCNC: 12 MG/DL (ref 6–20)
BUN/CREAT SERPL: 18 (ref 12–20)
CALCIUM SERPL-MCNC: 8.9 MG/DL (ref 8.5–10.1)
CHLORIDE SERPL-SCNC: 108 MMOL/L (ref 97–108)
CHOLEST SERPL-MCNC: 135 MG/DL
CO2 SERPL-SCNC: 28 MMOL/L (ref 21–32)
CREAT SERPL-MCNC: 0.68 MG/DL (ref 0.55–1.02)
CREAT UR-MCNC: 162 MG/DL
ERYTHROCYTE [DISTWIDTH] IN BLOOD BY AUTOMATED COUNT: 14.6 % (ref 11.5–14.5)
EST. AVERAGE GLUCOSE BLD GHB EST-MCNC: 163 MG/DL
GLOBULIN SER CALC-MCNC: 3.1 G/DL (ref 2–4)
GLUCOSE SERPL-MCNC: 183 MG/DL (ref 65–100)
HBA1C MFR BLD: 7.3 % (ref 4–5.6)
HCT VFR BLD AUTO: 36.8 % (ref 35–47)
HDLC SERPL-MCNC: 61 MG/DL
HDLC SERPL: 2.2 (ref 0–5)
HGB BLD-MCNC: 11.7 G/DL (ref 11.5–16)
LDLC SERPL CALC-MCNC: 54.6 MG/DL (ref 0–100)
MCH RBC QN AUTO: 25.7 PG (ref 26–34)
MCHC RBC AUTO-ENTMCNC: 31.8 G/DL (ref 30–36.5)
MCV RBC AUTO: 80.7 FL (ref 80–99)
MICROALBUMIN UR-MCNC: 1.19 MG/DL
MICROALBUMIN/CREAT UR-RTO: 7 MG/G (ref 0–30)
NRBC # BLD: 0 K/UL (ref 0–0.01)
NRBC BLD-RTO: 0 PER 100 WBC
PLATELET # BLD AUTO: 230 K/UL (ref 150–400)
PMV BLD AUTO: 11.4 FL (ref 8.9–12.9)
POTASSIUM SERPL-SCNC: 4.1 MMOL/L (ref 3.5–5.1)
PROT SERPL-MCNC: 6.2 G/DL (ref 6.4–8.2)
RBC # BLD AUTO: 4.56 M/UL (ref 3.8–5.2)
SODIUM SERPL-SCNC: 140 MMOL/L (ref 136–145)
TRIGL SERPL-MCNC: 97 MG/DL
VLDLC SERPL CALC-MCNC: 19.4 MG/DL
WBC # BLD AUTO: 5.6 K/UL (ref 3.6–11)

## 2024-04-30 ENCOUNTER — OFFICE VISIT (OUTPATIENT)
Facility: CLINIC | Age: 64
End: 2024-04-30
Payer: COMMERCIAL

## 2024-04-30 VITALS
TEMPERATURE: 97.6 F | RESPIRATION RATE: 20 BRPM | OXYGEN SATURATION: 97 % | SYSTOLIC BLOOD PRESSURE: 144 MMHG | HEIGHT: 68 IN | WEIGHT: 240 LBS | BODY MASS INDEX: 36.37 KG/M2 | DIASTOLIC BLOOD PRESSURE: 77 MMHG | HEART RATE: 89 BPM

## 2024-04-30 DIAGNOSIS — E88.09 HYPOALBUMINEMIA: Primary | ICD-10-CM

## 2024-04-30 DIAGNOSIS — E66.01 CLASS 2 SEVERE OBESITY DUE TO EXCESS CALORIES WITH SERIOUS COMORBIDITY AND BODY MASS INDEX (BMI) OF 35.0 TO 35.9 IN ADULT (HCC): ICD-10-CM

## 2024-04-30 DIAGNOSIS — G89.29 CHRONIC RIGHT SHOULDER PAIN: ICD-10-CM

## 2024-04-30 DIAGNOSIS — E11.65 CONTROLLED TYPE 2 DIABETES MELLITUS WITH HYPERGLYCEMIA, WITHOUT LONG-TERM CURRENT USE OF INSULIN (HCC): ICD-10-CM

## 2024-04-30 DIAGNOSIS — M25.511 CHRONIC RIGHT SHOULDER PAIN: ICD-10-CM

## 2024-04-30 DIAGNOSIS — I89.0 LYMPHATIC EDEMA: ICD-10-CM

## 2024-04-30 PROCEDURE — 3051F HG A1C>EQUAL 7.0%<8.0%: CPT | Performed by: FAMILY MEDICINE

## 2024-04-30 PROCEDURE — 99215 OFFICE O/P EST HI 40 MIN: CPT | Performed by: FAMILY MEDICINE

## 2024-04-30 PROCEDURE — 3077F SYST BP >= 140 MM HG: CPT | Performed by: FAMILY MEDICINE

## 2024-04-30 PROCEDURE — 3078F DIAST BP <80 MM HG: CPT | Performed by: FAMILY MEDICINE

## 2024-04-30 RX ORDER — GLIMEPIRIDE 4 MG/1
4 TABLET ORAL
Qty: 90 TABLET | Refills: 3 | Status: SHIPPED | OUTPATIENT
Start: 2024-04-30

## 2024-04-30 RX ORDER — GLIMEPIRIDE 4 MG/1
TABLET ORAL
Qty: 30 TABLET | Refills: 3 | Status: CANCELLED | OUTPATIENT
Start: 2024-04-30

## 2024-04-30 NOTE — PROGRESS NOTES
Assessment & Plan  1. Mild hypoalbuminemia.and lymphedema  The patient is advised to incorporate more protein into her diet. Get into lymph edema clinic.    2. Chronic right shoulder pain.  The patient's symptoms are indicative of severe rotator cuff tendinitis with impingement of multiple rotator cuff muscles. Home conservative treatment will be implemented, with the patient being more cognizant of activities, including decreased repetitive motions. Protective measures include rest, ice application, compression, and elevation. A handout detailing exercises will be provided to the patient. An x-ray of the right shoulder will be obtained. Go to physical therapy.    3. Diabetes.  The patient's A1c levels have decreased from 9 to 7.3. The patient's glimepiride prescription will be refilled. She is advised to continue her berberine supplements. Work with [facility]. A follow-up A1c test will be conducted in 3 months.    Follow-up  The patient is scheduled for a follow-up visit in 3 months for an annual physical and chronic shoulder pain.  It was a pleasure seeing Ms. Brandie Ahumada today.    History of Present Illness  The patient is a 63-year-old female who is here to follow up and talk about labs and she needs some refills.    The patient's A1c levels have decreased from 9 to 6.5 since 03/2024. She acknowledges inadequate protein intake, despite not being a vegetarian, and incorporating more vegetables into her diet. She supplements her diet with berberine 1200 mg daily. Her fasting blood glucose levels have been fluctuating, with a reading of 207 this morning and 137 yesterday. She has exhausted her supply of glimepiride and observed a deterioration in her blood sugar levels. Her naturopath recommended daily blood sugar monitoring. She adheres to a low-carbohydrate diet.    The patient was diagnosed with lymphedema during her childhood and has expressed concerns about her lymphatic system. She reports intermittent

## 2024-06-18 ENCOUNTER — HOSPITAL ENCOUNTER (OUTPATIENT)
Facility: HOSPITAL | Age: 64
Discharge: HOME | End: 2024-06-21
Payer: COMMERCIAL

## 2024-06-18 DIAGNOSIS — M25.511 CHRONIC RIGHT SHOULDER PAIN: ICD-10-CM

## 2024-06-18 DIAGNOSIS — G89.29 CHRONIC RIGHT SHOULDER PAIN: ICD-10-CM

## 2024-06-18 PROCEDURE — 73030 X-RAY EXAM OF SHOULDER: CPT

## 2024-07-03 ENCOUNTER — PATIENT MESSAGE (OUTPATIENT)
Facility: CLINIC | Age: 64
End: 2024-07-03

## 2024-07-03 DIAGNOSIS — E11.65 CONTROLLED TYPE 2 DIABETES MELLITUS WITH HYPERGLYCEMIA, WITHOUT LONG-TERM CURRENT USE OF INSULIN (HCC): ICD-10-CM

## 2024-07-05 NOTE — TELEPHONE ENCOUNTER
Akil Boggs MD 7/5/2024 3:51 PM EDT    Please gather more info from patient, what is \"too high\" and see if this is something that can wait till next visit or needs adjustment now. Thank you  ----- Message -----  From: Charbel Martinez LPN  Sent: 7/5/2024 8:42 AM EDT  To: Akil Boggs MD  Subject: FW: Blood glucose levels too high       ----- Message -----  From: Brandie Ahumada \"Elena\"  Sent: 7/3/2024 9:00 PM EDT  To: *  Subject: Blood glucose levels too high     Not sure why glucose levels not stable .levels too high may need to increase medicine

## 2024-07-08 RX ORDER — GLIMEPIRIDE 4 MG/1
4 TABLET ORAL
Qty: 180 TABLET | Refills: 3 | Status: SHIPPED | OUTPATIENT
Start: 2024-07-08

## 2024-07-25 ENCOUNTER — PATIENT MESSAGE (OUTPATIENT)
Facility: CLINIC | Age: 64
End: 2024-07-25

## 2024-07-25 DIAGNOSIS — M25.511 CHRONIC RIGHT SHOULDER PAIN: Primary | ICD-10-CM

## 2024-07-25 DIAGNOSIS — G89.29 CHRONIC RIGHT SHOULDER PAIN: Primary | ICD-10-CM

## 2024-07-29 NOTE — TELEPHONE ENCOUNTER
From: Brandie Ahumada  To: Dr. Akil Boggs  Sent: 7/25/2024 1:55 PM EDT  Subject: Physical therapy shoulder    I need a referral for physical therapy for [place] [address]  My apmnt is July 31,2024    10am    Please note your referral is in [place] 1 hour plus from my home in [place]. Please reply to advise.

## 2024-07-30 DIAGNOSIS — E88.09 HYPOALBUMINEMIA: ICD-10-CM

## 2024-07-30 DIAGNOSIS — E11.65 CONTROLLED TYPE 2 DIABETES MELLITUS WITH HYPERGLYCEMIA, WITHOUT LONG-TERM CURRENT USE OF INSULIN (HCC): ICD-10-CM

## 2024-07-31 ENCOUNTER — HOSPITAL ENCOUNTER (OUTPATIENT)
Facility: HOSPITAL | Age: 64
Setting detail: RECURRING SERIES
Discharge: HOME | End: 2024-08-03
Payer: COMMERCIAL

## 2024-07-31 PROCEDURE — 97161 PT EVAL LOW COMPLEX 20 MIN: CPT

## 2024-07-31 PROCEDURE — 97110 THERAPEUTIC EXERCISES: CPT

## 2024-07-31 NOTE — THERAPY EVALUATION
[facility] [address], [address]  [place] [address]  Ph: [phone]     Fax: [phone]          PHYSICAL THERAPY - EVALUATION/PLAN OF CARE NOTE (updated 3/23)      Date: 2024          Patient Name:  Brandie Ahumada :  1960   Medical   Diagnosis:  Chronic right shoulder pain [M25.511, G89.29] Treatment Diagnosis:  M25.511  RIGHT SHOULDER PAIN    Referral Source:  Akil Boggs MD Provider #:  [id number]                Insurance: Payor: [facility] / Plan: [facility] [place] / Product Type: *No Product type* /      Patient  verified yes     Visit #   Current  / Total 1 16   Time   In / Out 1043am 1137am   Total Treatment Time 54   Total Timed Codes 10         SUBJECTIVE  Pain Level (0-10 scale): mild pain at rest (2/10), severe 7-8/10 with movement  []constant [x]intermittent []improving [x]worsening []no change since onset  Pain improves with : ice, icy-hot cream, heat, sitting upright  Worsens with : lying down, reaching overhead    Any medication changes, allergies to medications, adverse drug reactions, diagnosis change, or new procedure performed?: [x] No    [] Yes (see summary sheet for update)  Medications: Verified on Patient Summary List    Subjective functional status/changes:     Pt reports ongoing R shoulder pain for the past 2-3 months. Pain is especially bad at night and the pt uses ice to help her sleep. She is having trouble lifting her arm overhead. Does not remember any particular injury but she did move at the end of last year and was doing a lot of strenuous lifting. She believes her symptoms are worsening vs improving. She has had an x-ray which was WNL. States her PCP has told her she may have a rotator cuff tear or a frozen shoulder.    Start of Care: 2024  Onset Date: ~2024, possibly earlier  Current symptoms/Complaints: pain, inability to reach overhead  Mechanism of Injury: unknown  PLOF: independent with

## 2024-08-06 ENCOUNTER — HOSPITAL ENCOUNTER (OUTPATIENT)
Facility: HOSPITAL | Age: 64
Setting detail: RECURRING SERIES
Discharge: HOME | End: 2024-08-09
Payer: COMMERCIAL

## 2024-08-06 PROCEDURE — G0283 ELEC STIM OTHER THAN WOUND: HCPCS

## 2024-08-06 PROCEDURE — 97110 THERAPEUTIC EXERCISES: CPT

## 2024-08-06 NOTE — PROGRESS NOTES
PHYSICAL THERAPY - MEDICARE DAILY TREATMENT NOTE (updated 3/23)      Date: 2024          Patient Name:  Brandie Ahumada :  1960   Medical   Diagnosis:  Chronic right shoulder pain [M25.511, G89.29] Treatment Diagnosis:  M25.511 RIGHT SHOULDER PAIN    Referral Source:  Akil Boggs MD Insurance:   Payor: [facility] / Plan: [facility] / Product Type: *No Product type* /                     Patient  verified yes     Visit #   Current  / Total 2 16   Time   In / Out 10:15 am 11:10   Total Treatment Time 53 min   Total Timed Codes 38 min   1:1 Treatment Time 38min      [facility] Totals Reminder:  bill using total billable   min of TIMED therapeutic procedures and modalities.   8-22 min = 1 unit; 23-37 min = 2 units; 38-52 min = 3 units; 53-67 min = 4 units; 68-82 min = 5 units            SUBJECTIVE    Pain Level (0-10 scale): 3/10    Any medication changes, allergies to medications, adverse drug reactions, diagnosis change, or new procedure performed?: [x] No    [] Yes (see summary sheet for update)  Medications: Verified on Patient Summary List    Subjective functional status/changes:     Patient arrived 15 min late for appointment due to traffic. Reports pain to R shoulder. Patient states she uses hp during the day and a cp shoulder sleeve at night.       OBJECTIVE      Therapeutic Procedures:  Tx Min Billable or 1:1 Min (if diff from Tx Min) Procedure, Rationale, Specifics   38  81155 Therapeutic Exercise (timed):  increase ROM, strength, coordination, balance, and proprioception to improve patient's ability to progress to PLOF and address remaining functional goals. (see flow sheet as applicable)     Details if applicable:       83532 Manual Therapy (timed):  decrease pain, increase ROM, increase tissue extensibility, and decrease trigger points to improve patient's ability to progress to PLOF and address remaining functional goals.  The manual therapy interventions were performed at a separate and

## 2024-08-13 ENCOUNTER — OFFICE VISIT (OUTPATIENT)
Facility: CLINIC | Age: 64
End: 2024-08-13

## 2024-08-13 VITALS
HEART RATE: 75 BPM | DIASTOLIC BLOOD PRESSURE: 85 MMHG | BODY MASS INDEX: 37.89 KG/M2 | TEMPERATURE: 98.1 F | HEIGHT: 68 IN | OXYGEN SATURATION: 98 % | RESPIRATION RATE: 16 BRPM | SYSTOLIC BLOOD PRESSURE: 136 MMHG | WEIGHT: 250 LBS

## 2024-08-13 DIAGNOSIS — M25.511 CHRONIC RIGHT SHOULDER PAIN: ICD-10-CM

## 2024-08-13 DIAGNOSIS — G89.29 CHRONIC RIGHT SHOULDER PAIN: ICD-10-CM

## 2024-08-13 DIAGNOSIS — I10 ESSENTIAL HYPERTENSION: ICD-10-CM

## 2024-08-13 DIAGNOSIS — E11.65 CONTROLLED TYPE 2 DIABETES MELLITUS WITH HYPERGLYCEMIA, WITHOUT LONG-TERM CURRENT USE OF INSULIN (HCC): ICD-10-CM

## 2024-08-13 DIAGNOSIS — Z00.01 ENCOUNTER FOR WELL ADULT EXAM WITH ABNORMAL FINDINGS: Primary | ICD-10-CM

## 2024-08-13 NOTE — PROGRESS NOTES
Chief Complaint   Patient presents with    Annual Exam     Annual pe per insurance for credits.  Blood sugars was 180 this am and yesterday.  [facility] is no thtat great due t pain in shoulders which she goes to pt which does help some. Still hurts but does have increase in ROM.  Bilat leg pain as well.  Dx lymphedema as a child.  Plans on retiring this year.      Diabetes       
1-dose 60+ series) Never done    COVID-19 Vaccine (1 - 2023-24 season) Never done    Flu vaccine (1) Never done      Recommendations for Preventive Services Due: see orders and patient instructions/AVS.    The patient (or guardian, if applicable) and other individuals in attendance with the patient were advised that Artificial Intelligence will be utilized during this visit to record and process the conversation to generate a clinical note. The patient (or guardian, if applicable) and other individuals in attendance at the appointment consented to the use of AI, including the recording.

## 2024-08-13 NOTE — ASSESSMENT & PLAN NOTE
Improving, continue efforts. Send me a message when starts new plan to see if still wants GLP 1. Discussed risks/benefits. Also talked about continuous glucose monitor. Foot exam completed.

## 2024-08-13 NOTE — PATIENT INSTRUCTIONS
your skin from too much sun, wash your hands, brush your teeth twice a day, and wear a seat belt in the car.   Where can you learn more?  Go to [email or web address] and enter K859 to learn more about \"Well Visit, Over 65: Care Instructions.\"  Current as of: August 6, 2023  Content Version: 14.1  © 1446-4738 [employer].   Care instructions adapted under license by [employer]. If you have questions about a medical condition or this instruction, always ask your healthcare professional. [employer] disclaims any warranty or liability for your use of this information.

## 2024-09-12 ENCOUNTER — PATIENT MESSAGE (OUTPATIENT)
Facility: CLINIC | Age: 64
End: 2024-09-12

## 2024-09-12 ENCOUNTER — HOSPITAL ENCOUNTER (OUTPATIENT)
Facility: HOSPITAL | Age: 64
Setting detail: RECURRING SERIES
Discharge: HOME | End: 2024-09-15
Payer: MEDICARE

## 2024-09-12 DIAGNOSIS — E11.65 CONTROLLED TYPE 2 DIABETES MELLITUS WITH HYPERGLYCEMIA, WITHOUT LONG-TERM CURRENT USE OF INSULIN (HCC): Primary | ICD-10-CM

## 2024-09-12 DIAGNOSIS — E66.01 CLASS 2 SEVERE OBESITY DUE TO EXCESS CALORIES WITH SERIOUS COMORBIDITY AND BODY MASS INDEX (BMI) OF 35.0 TO 35.9 IN ADULT (HCC): ICD-10-CM

## 2024-09-12 DIAGNOSIS — I10 ESSENTIAL HYPERTENSION: ICD-10-CM

## 2024-09-12 PROCEDURE — 97110 THERAPEUTIC EXERCISES: CPT

## 2024-09-12 PROCEDURE — G0283 ELEC STIM OTHER THAN WOUND: HCPCS

## 2024-09-13 RX ORDER — TIRZEPATIDE 2.5 MG/.5ML
2.5 INJECTION, SOLUTION SUBCUTANEOUS WEEKLY
Qty: 2 ML | Refills: 0 | Status: SHIPPED | OUTPATIENT
Start: 2024-09-13 | End: 2024-10-13

## 2024-09-13 RX ORDER — TIRZEPATIDE 5 MG/.5ML
5 INJECTION, SOLUTION SUBCUTANEOUS WEEKLY
Qty: 2 ML | Refills: 3 | Status: SHIPPED | OUTPATIENT
Start: 2024-10-13

## 2024-09-16 ENCOUNTER — HOSPITAL ENCOUNTER (OUTPATIENT)
Facility: HOSPITAL | Age: 64
Setting detail: RECURRING SERIES
Discharge: HOME | End: 2024-09-19
Payer: MEDICARE

## 2024-09-16 PROCEDURE — 97110 THERAPEUTIC EXERCISES: CPT

## 2024-09-16 PROCEDURE — G0283 ELEC STIM OTHER THAN WOUND: HCPCS

## 2024-09-18 ENCOUNTER — HOSPITAL ENCOUNTER (OUTPATIENT)
Facility: HOSPITAL | Age: 64
Setting detail: RECURRING SERIES
End: 2024-09-18
Payer: MEDICARE

## 2024-09-23 ENCOUNTER — HOSPITAL ENCOUNTER (OUTPATIENT)
Facility: HOSPITAL | Age: 64
Setting detail: RECURRING SERIES
Discharge: HOME | End: 2024-09-26
Payer: MEDICARE

## 2024-09-23 PROCEDURE — G0283 ELEC STIM OTHER THAN WOUND: HCPCS

## 2024-09-23 PROCEDURE — 97110 THERAPEUTIC EXERCISES: CPT

## 2024-09-25 ENCOUNTER — HOSPITAL ENCOUNTER (OUTPATIENT)
Facility: HOSPITAL | Age: 64
Setting detail: RECURRING SERIES
Discharge: HOME | End: 2024-09-28
Payer: MEDICARE

## 2024-09-25 PROCEDURE — G0283 ELEC STIM OTHER THAN WOUND: HCPCS

## 2024-09-25 PROCEDURE — 97110 THERAPEUTIC EXERCISES: CPT

## 2024-09-26 ENCOUNTER — TELEPHONE (OUTPATIENT)
Facility: CLINIC | Age: 64
End: 2024-09-26

## 2024-09-30 ENCOUNTER — HOSPITAL ENCOUNTER (OUTPATIENT)
Facility: HOSPITAL | Age: 64
Setting detail: RECURRING SERIES
Discharge: HOME | End: 2024-10-03
Payer: MEDICARE

## 2024-09-30 PROCEDURE — 97110 THERAPEUTIC EXERCISES: CPT

## 2024-09-30 NOTE — PROGRESS NOTES
PHYSICAL THERAPY - MEDICARE DAILY TREATMENT NOTE (updated 3/23)      Date: 2024          Patient Name:  Brandie Ahumada :  1960   Medical   Diagnosis:  Chronic right shoulder pain [M25.511, G89.29] Treatment Diagnosis:  M25.511 RIGHT SHOULDER PAIN    Referral Source:  Akil Boggs MD Insurance:   Payor: MEDICARE / Plan: MEDICARE PART A AND B / Product Type: *No Product type* /                     Patient  verified yes     Visit #   Current  / Total 7 16   Time   In / Out 12:15pm 12:55pm   Total Treatment Time 40 min   Total Timed Codes 35 min   1:1 Treatment Time 35 min      [facility] Totals Reminder:  bill using total billable   min of TIMED therapeutic procedures and modalities.   8-22 min = 1 unit; 23-37 min = 2 units; 38-52 min = 3 units; 53-67 min = 4 units; 68-82 min = 5 units            SUBJECTIVE    Pain Level (0-10 scale): 0/10    Any medication changes, allergies to medications, adverse drug reactions, diagnosis change, or new procedure performed?: [x] No    [] Yes (see summary sheet for update)  Medications: Verified on Patient Summary List    Subjective functional status/changes:     Patient reports continuing to feel overall improvement.     OBJECTIVE      Therapeutic Procedures:  Tx Min Billable or 1:1 Min (if diff from Tx Min) Procedure, Rationale, Specifics   30  20438 Therapeutic Exercise (timed):  increase ROM, strength, coordination, balance, and proprioception to improve patient's ability to progress to PLOF and address remaining functional goals. (see flow sheet as applicable)     Details if applicable:     5  23792 Manual Therapy (timed):  decrease pain, increase ROM, increase tissue extensibility, and decrease trigger points to improve patient's ability to progress to PLOF and address remaining functional goals.  The manual therapy interventions were performed at a separate and distinct time from the therapeutic activities interventions . (see flow sheet as applicable)

## 2024-10-02 ENCOUNTER — HOSPITAL ENCOUNTER (OUTPATIENT)
Facility: HOSPITAL | Age: 64
Setting detail: RECURRING SERIES
Discharge: HOME | End: 2024-10-05
Payer: MEDICARE

## 2024-10-02 PROCEDURE — 97110 THERAPEUTIC EXERCISES: CPT

## 2024-10-02 PROCEDURE — G0283 ELEC STIM OTHER THAN WOUND: HCPCS

## 2024-10-02 NOTE — PROGRESS NOTES
24  Pt will verbalize process and use ice/heat/massage to assist with inflammation and pain management as instructed to prevent pain >6/10 on VAS.[x] Met [] Not met [] Partially met  Date: 24 discussed time management for hp and cp  Pt will improve elevation ROM to at least 90 degrees without increased pain to facilitate functional reach.[] Met [x] Not met [] Partially met  Date:   Pt will perform bed mobility/transfers without requiring shoulder positioning in ADD/IR/elbow flexion for pain relief.[] Met [x] Not met [] Partially met  Date:   Pt will verbalize and use proper positioning for sleeping/resting to help with pain/inflammation and decrease strain on the joint.[] Met [] Not met [x] Partially met  Date:     Long Term Goals: To be accomplished in 16 treatments.  Pt will improve AmPAC score by 10%.[] Met [x] Not met [] Partially met  Date:   Pt will demonstrate shoulder elevation ROM >140 degrees without pain to facilitate functional reach.[] Met [x] Not met [] Partially met  Date:   Pt will demonstrate ability to carry and lift 10 lbs in the R hand without increased pain to allow pt to bring in groceries, etc.[] Met [x] Not met [] Partially met  Date:   Pt will report ability to sleep through the night without waking due to pain.[] Met [x] Not met [] Partially met  Date:  Pt will report ability to dress herself without pain or difficulty, including reaching behind her back to tuck in shirt.[] Met [] Not met [x] Partially met  Date: dressing her self       PLAN  Frequency / Duration: Patient to be seen 1-2 times per week for 16 treatments.    Treatment Plan may include any combination of the followin Therapeutic Exercise, 61380 Neuromuscular Re-Education, 04144 Manual Therapy, 41095 Therapeutic Activity, 82272 Self Care/Home Management, 64782 Electrical Stim unattended, 11358 Gait Training, 95817 Ultrasound, and (Elective Self Pay) Needle Insertion w/o Injection (1 or 2 muscles), (3+

## 2024-10-07 ENCOUNTER — HOSPITAL ENCOUNTER (OUTPATIENT)
Facility: HOSPITAL | Age: 64
Setting detail: RECURRING SERIES
Discharge: HOME | End: 2024-10-10
Payer: MEDICARE

## 2024-10-07 PROCEDURE — 97110 THERAPEUTIC EXERCISES: CPT

## 2024-10-07 PROCEDURE — G0283 ELEC STIM OTHER THAN WOUND: HCPCS

## 2024-10-07 PROCEDURE — 97140 MANUAL THERAPY 1/> REGIONS: CPT

## 2024-10-07 NOTE — PROGRESS NOTES
PHYSICAL THERAPY - MEDICARE DAILY TREATMENT NOTE (updated 3/23)      Date: 10/7/2024          Patient Name:  Brandie Ahumada :  1960   Medical   Diagnosis:  Chronic right shoulder pain [M25.511, G89.29] Treatment Diagnosis:  M25.511 RIGHT SHOULDER PAIN    Referral Source:  Akil Boggs MD Insurance:   Payor: MEDICARE / Plan: MEDICARE PART A AND B / Product Type: *No Product type* /                     Patient  verified yes     Visit #   Current  / Total 9 16   Time   In / Out 11:13 am 12:25 am   Total Treatment Time 70 min   Total Timed Codes 55 min   1:1 Treatment Time 55 min      [facility] Totals Reminder:  bill using total billable   min of TIMED therapeutic procedures and modalities.   8-22 min = 1 unit; 23-37 min = 2 units; 38-52 min = 3 units; 53-67 min = 4 units; 68-82 min = 5 units            SUBJECTIVE    Pain Level (0-10 scale): 0/10    Any medication changes, allergies to medications, adverse drug reactions, diagnosis change, or new procedure performed?: [x] No    [] Yes (see summary sheet for update)  Medications: Verified on Patient Summary List    Subjective functional status/changes:     Has been performing chair aerobics in past week, has been able to perform arm exercises within tolerable range.  Reports shoulder feeling better today compared to previous visit.     OBJECTIVE      Therapeutic Procedures:  Tx Min Billable or 1:1 Min (if diff from Tx Min) Procedure, Rationale, Specifics   45  08816 Therapeutic Exercise (timed):  increase ROM, strength, coordination, balance, and proprioception to improve patient's ability to progress to PLOF and address remaining functional goals. (see flow sheet as applicable)     Details if applicable:     10  47899 Manual Therapy (timed):  decrease pain, increase ROM, increase tissue extensibility, and decrease trigger points to improve patient's ability to progress to PLOF and address remaining functional goals.  The manual therapy interventions were

## 2024-10-09 ENCOUNTER — HOSPITAL ENCOUNTER (OUTPATIENT)
Facility: HOSPITAL | Age: 64
Setting detail: RECURRING SERIES
Discharge: HOME | End: 2024-10-12
Payer: MEDICARE

## 2024-10-09 PROCEDURE — G0283 ELEC STIM OTHER THAN WOUND: HCPCS

## 2024-10-09 PROCEDURE — 97110 THERAPEUTIC EXERCISES: CPT

## 2024-10-09 NOTE — PROGRESS NOTES
distinct time from the therapeutic activities interventions . (see flow sheet as applicable)     Details if applicable:  PROM R shoulder                   50     Total Total         Modalities Rationale:     decrease inflammation, decrease pain, and increase tissue extensibility to improve patient's ability to progress to PLOF and address remaining functional goals.    15   min [x] Estim Unattended,             type/location:R sh       []  w/ice    [x]  w/heat  supine    min  unbilled []  Ice     []  Heat            location/position:    Skin assessment post-treatment (if applicable):    [x]  intact    []  redness- no adverse reaction                 []redness adverse reaction:     [x]  Patient Education billed concurrently with other procedures   [x] Review HEP    [] Progressed/Changed HEP, detail:    [] Other detail:         Other Objective/Functional Measures  See exercise flow sheet- Increased repetitions with resisted theraband exercises.   Added SA punch with Assistance, Side lying Abd with Assistance,  Attempted prone exercises but patient reported increase discomfort.    Pain Level at end of session (0-10 scale): 0/10      Assessment   Patient tolerated treatment session  with some discomfort with prone exercises. She was able to tolerate new exercises in sidelying and supine without increase in pain or symptoms. She is also increasing activity at home and with chair exercises. No pain or discomfort following modalities. Plan to continue to progress pt accordingly.   Patient will continue to benefit from skilled PT / OT services to modify and progress therapeutic interventions, analyze and address functional mobility deficits, analyze and address ROM deficits, analyze and address strength deficits, and analyze and address soft tissue restrictions to address functional deficits and attain remaining goals.    Progress toward goals / Updated goals:  []  See Progress Note/Recertification     Short Term Goals: To

## 2024-10-14 ENCOUNTER — HOSPITAL ENCOUNTER (OUTPATIENT)
Facility: HOSPITAL | Age: 64
Setting detail: RECURRING SERIES
Discharge: HOME | End: 2024-10-17
Payer: MEDICARE

## 2024-10-14 PROCEDURE — G0283 ELEC STIM OTHER THAN WOUND: HCPCS

## 2024-10-14 PROCEDURE — 97110 THERAPEUTIC EXERCISES: CPT

## 2024-10-14 NOTE — PROGRESS NOTES
PHYSICAL THERAPY - MEDICARE DAILY TREATMENT NOTE (updated 3/23)      Date: 10/14/2024          Patient Name:  Brandie Ahumada :  1960   Medical   Diagnosis:  Chronic right shoulder pain [M25.511, G89.29] Treatment Diagnosis:  M25.511 RIGHT SHOULDER PAIN    Referral Source:  Akil Boggs MD Insurance:   Payor: MEDICARE / Plan: MEDICARE PART A AND B / Product Type: *No Product type* /                     Patient  verified yes     Visit #   Current  / Total 11 16   Time   In / Out 11:30 am 12:35 pm   Total Treatment Time 65 min   Total Timed Codes 50 min   1:1 Treatment Time 50 min      [facility] Totals Reminder:  bill using total billable   min of TIMED therapeutic procedures and modalities.   8-22 min = 1 unit; 23-37 min = 2 units; 38-52 min = 3 units; 53-67 min = 4 units; 68-82 min = 5 units            SUBJECTIVE    Pain Level (0-10 scale): 0/10    Any medication changes, allergies to medications, adverse drug reactions, diagnosis change, or new procedure performed?: [x] No    [] Yes (see summary sheet for update)  Medications: Verified on Patient Summary List    Subjective functional status/changes:     Patient reports shoulder didn't bother her much over weekend with party, avoided use of right arm.     OBJECTIVE      Therapeutic Procedures:  Tx Min Billable or 1:1 Min (if diff from Tx Min) Procedure, Rationale, Specifics   45  18965 Therapeutic Exercise (timed):  increase ROM, strength, coordination, balance, and proprioception to improve patient's ability to progress to PLOF and address remaining functional goals. (see flow sheet as applicable)     Details if applicable:     5  53538 Manual Therapy (timed):  decrease pain, increase ROM, increase tissue extensibility, and decrease trigger points to improve patient's ability to progress to PLOF and address remaining functional goals.  The manual therapy interventions were performed at a separate and distinct time from the therapeutic activities

## 2024-10-16 ENCOUNTER — APPOINTMENT (OUTPATIENT)
Facility: HOSPITAL | Age: 64
End: 2024-10-16
Payer: MEDICARE

## 2024-10-21 ENCOUNTER — HOSPITAL ENCOUNTER (OUTPATIENT)
Facility: HOSPITAL | Age: 64
Setting detail: RECURRING SERIES
Discharge: HOME | End: 2024-10-24
Payer: MEDICARE

## 2024-10-21 PROCEDURE — G0283 ELEC STIM OTHER THAN WOUND: HCPCS

## 2024-10-21 PROCEDURE — 97110 THERAPEUTIC EXERCISES: CPT

## 2024-10-21 NOTE — PROGRESS NOTES
PHYSICAL THERAPY - MEDICARE DAILY TREATMENT NOTE (updated 3/23)      Date: 10/21/2024          Patient Name:  Brandie Ahumada :  1960   Medical   Diagnosis:  Chronic right shoulder pain [M25.511, G89.29] Treatment Diagnosis:  M25.511 RIGHT SHOULDER PAIN    Referral Source:  Akil Boggs MD Insurance:   Payor: MEDICARE / Plan: MEDICARE PART A AND B / Product Type: *No Product type* /                     Patient  verified yes     Visit #   Current  / Total 12 16   Time   In / Out 10:10 am 11:10 am   Total Treatment Time 60 min   Total Timed Codes 45 min   1:1 Treatment Time 45 min      [facility] Totals Reminder:  bill using total billable   min of TIMED therapeutic procedures and modalities.   8-22 min = 1 unit; 23-37 min = 2 units; 38-52 min = 3 units; 53-67 min = 4 units; 68-82 min = 5 units            SUBJECTIVE    Pain Level (0-10 scale): 0/10    Any medication changes, allergies to medications, adverse drug reactions, diagnosis change, or new procedure performed?: [x] No    [] Yes (see summary sheet for update)  Medications: Verified on Patient Summary List    Subjective functional status/changes:     Patient reports overall improvement in shoulder mobility, strength and pain since starting PT intervention. Has been using RUE more often with daily activities, using arm to lift light objects (gallon of milk), dressing, toileting, opening doors. Reports she's able to reach higher, able to perform exercises in aerobic class with less modifications. Continues to have some difficulty with sleeping, uncomfortable throughout night, having to change positions.     OBJECTIVE    Posture:  protracted shoulders, forward head posture  Palpation: tightness through R shoulder girdle; TTP over pec major/minor, deltoids, bicep  Neurological: Reflexes / Sensations: intact    Specific joints: *normal values in (degrees)  SHOULDER                                         AROM                        PROM

## 2024-10-21 NOTE — PROGRESS NOTES
[facility] [address], [address]  [place] [address]  Ph: [phone]     Fax: [phone]    PHYSICAL THERAPY PROGRESS NOTE  Patient Name:  Brandie Ahumada :  1960   Treatment/Medical Diagnosis: Chronic right shoulder pain [M25.511, G89.29]   Referral Source:  Akil Boggs MD     Date of Initial Visit:  24 Attended Visits:  12 Missed Visits:  0     SUMMARY OF TREATMENT/ASSESSMENT:    Patient has completed 12 skilled physical therapy visits for treatment of right shoulder pain. Has made steady improvement in shoulder active range of motion, UE strength, mobility and self reported function. Patient has made gains in all directions of shoulder range of motion however minimal to no gains with functional IR and extension noting pain along superoanterior aspect of shoulder with movement. Patient reports ability to lift gallon of milk from elevated shelf without increase pain and perform daily tasks with greater ease. Continues to have overall deficit in shoulder strength with all movements and limitations with dressing, lifting heavier objects and reaching overhead due to restricted mobility. Patient could continue to benefit from skilled physical therapy to improve upper extremity functional strength, overhead mobility, normalize GH mechanics and work on remaining deficits.     CURRENT STATUS/GOALS:    Posture:  protracted shoulders, forward head posture  Palpation: tightness through R shoulder girdle; TTP over pec major/minor, deltoids, bicep  Neurological: Reflexes / Sensations: intact     Specific joints: *normal values in (degrees)  SHOULDER                                         AROM                        PROM                         MMT    R L R L R L   Flexion (180) 116 170 130 146   3+  5   Extension (60) 38 p! 60           Abduction (180) 100 175 70 135   3- 5    Adduction (0)               IR (70) To buttock p! To T4     4-   5   ER (90)

## 2024-10-23 ENCOUNTER — HOSPITAL ENCOUNTER (OUTPATIENT)
Facility: HOSPITAL | Age: 64
Setting detail: RECURRING SERIES
Discharge: HOME | End: 2024-10-26
Payer: MEDICARE

## 2024-10-23 PROCEDURE — G0283 ELEC STIM OTHER THAN WOUND: HCPCS

## 2024-10-23 PROCEDURE — 97110 THERAPEUTIC EXERCISES: CPT

## 2024-10-23 NOTE — PROGRESS NOTES
85075 Therapeutic Exercise, 23290 Neuromuscular Re-Education, 54728 Manual Therapy, 57794 Therapeutic Activity, 94806 Self Care/Home Management, 69157 Electrical Stim unattended, 35148 Gait Training, 04911 Ultrasound, and (Elective Self Pay) Needle Insertion w/o Injection (1 or 2 muscles), (3+ muscles)    yes  Continue plan of care  Re-Cert Due: after 16 visits  [x]  Upgrade activities as tolerated  []  Discharge due to:  []  Other:      Wendy Mata, PT       10/23/2024       10:06 AM

## 2024-10-30 ENCOUNTER — HOSPITAL ENCOUNTER (OUTPATIENT)
Facility: HOSPITAL | Age: 64
Setting detail: RECURRING SERIES
Discharge: HOME | End: 2024-11-02
Payer: MEDICARE

## 2024-10-30 PROCEDURE — 97110 THERAPEUTIC EXERCISES: CPT

## 2024-10-30 PROCEDURE — G0283 ELEC STIM OTHER THAN WOUND: HCPCS

## 2024-10-30 NOTE — PROGRESS NOTES
PHYSICAL THERAPY - MEDICARE DAILY TREATMENT NOTE (updated 3/23)      Date: 10/30/2024          Patient Name:  Brandie Ahumada :  1960   Medical   Diagnosis:  Chronic right shoulder pain [M25.511, G89.29] Treatment Diagnosis:  M25.511 RIGHT SHOULDER PAIN    Referral Source:  Akil Boggs MD Insurance:   Payor: MEDICARE / Plan: MEDICARE PART A AND B / Product Type: *No Product type* /                     Patient  verified yes     Visit #   Current  / Total 14 16   Time   In / Out 11:37 am 12:35 pm   Total Treatment Time 55 min   Total Timed Codes 40 min   1:1 Treatment Time 40 min      [facility] Totals Reminder:  bill using total billable   min of TIMED therapeutic procedures and modalities.   8-22 min = 1 unit; 23-37 min = 2 units; 38-52 min = 3 units; 53-67 min = 4 units; 68-82 min = 5 units            SUBJECTIVE    Pain Level (0-10 scale): 0/10    Any medication changes, allergies to medications, adverse drug reactions, diagnosis change, or new procedure performed?: [x] No    [] Yes (see summary sheet for update)  Medications: Verified on Patient Summary List    Subjective functional status/changes:     Patient reports she has joined a chair exercise program that really works her arms. She states she does not have any pain only soreness.  Patient arrived late for therapy session.  OBJECTIVE      Therapeutic Procedures:  Tx Min Billable or 1:1 Min (if diff from Tx Min) Procedure, Rationale, Specifics   40  14453 Therapeutic Exercise (timed):  increase ROM, strength, coordination, balance, and proprioception to improve patient's ability to progress to PLOF and address remaining functional goals. (see flow sheet as applicable)     Details if applicable:       75149 Manual Therapy (timed):  decrease pain, increase ROM, increase tissue extensibility, and decrease trigger points to improve patient's ability to progress to PLOF and address remaining functional goals.  The manual therapy interventions were

## 2024-11-01 ENCOUNTER — HOSPITAL ENCOUNTER (OUTPATIENT)
Facility: HOSPITAL | Age: 64
Setting detail: RECURRING SERIES
Discharge: HOME | End: 2024-11-04
Payer: MEDICARE

## 2024-11-01 PROCEDURE — 97110 THERAPEUTIC EXERCISES: CPT

## 2024-11-01 PROCEDURE — G0283 ELEC STIM OTHER THAN WOUND: HCPCS

## 2024-11-01 PROCEDURE — 97112 NEUROMUSCULAR REEDUCATION: CPT

## 2024-11-01 NOTE — PROGRESS NOTES
PHYSICAL THERAPY - MEDICARE DAILY TREATMENT NOTE (updated 3/23)      Date: 2024          Patient Name:  Brandie Ahumada :  1960   Medical   Diagnosis:  Chronic right shoulder pain [M25.511, G89.29] Treatment Diagnosis:  M25.511 RIGHT SHOULDER PAIN    Referral Source:  Akil Boggs MD Insurance:   Payor: MEDICARE / Plan: MEDICARE PART A AND B / Product Type: *No Product type* /                     Patient  verified yes     Visit #   Current  / Total 15 16   Time   In / Out 11:35 am 12:35 pm   Total Treatment Time 55 min   Total Timed Codes 45 min   1:1 Treatment Time 45 min      [facility] Totals Reminder:  bill using total billable   min of TIMED therapeutic procedures and modalities.   8-22 min = 1 unit; 23-37 min = 2 units; 38-52 min = 3 units; 53-67 min = 4 units; 68-82 min = 5 units            SUBJECTIVE    Pain Level (0-10 scale): 0/10    Any medication changes, allergies to medications, adverse drug reactions, diagnosis change, or new procedure performed?: [x] No    [] Yes (see summary sheet for update)  Medications: Verified on Patient Summary List    Subjective functional status/changes:     Patient has no current complaints    OBJECTIVE      Therapeutic Procedures:  Tx Min Billable or 1:1 Min (if diff from Tx Min) Procedure, Rationale, Specifics   30  08411 Therapeutic Exercise (timed):  increase ROM, strength, coordination, balance, and proprioception to improve patient's ability to progress to PLOF and address remaining functional goals. (see flow sheet as applicable)     Details if applicable:     5  66216 Manual Therapy (timed):  decrease pain, increase ROM, increase tissue extensibility, and decrease trigger points to improve patient's ability to progress to PLOF and address remaining functional goals.  The manual therapy interventions were performed at a separate and distinct time from the therapeutic activities interventions . (see flow sheet as applicable)     Details if

## 2024-11-04 ENCOUNTER — HOSPITAL ENCOUNTER (OUTPATIENT)
Facility: HOSPITAL | Age: 64
Setting detail: RECURRING SERIES
Discharge: HOME | End: 2024-11-07
Payer: MEDICARE

## 2024-11-04 PROCEDURE — 97110 THERAPEUTIC EXERCISES: CPT

## 2024-11-04 PROCEDURE — G0283 ELEC STIM OTHER THAN WOUND: HCPCS

## 2024-11-04 PROCEDURE — 97112 NEUROMUSCULAR REEDUCATION: CPT

## 2024-11-04 NOTE — PROGRESS NOTES
functional goals. (see flow sheet as applicable)    Details if applicable:             45     Total Total         Modalities Rationale:     decrease inflammation, decrease pain, and increase tissue extensibility to improve patient's ability to progress to PLOF and address remaining functional goals.    15   min [x] Estim Unattended,             type/location:R sh       []  w/ice    [x]  w/heat  seated    min  unbilled []  Ice     []  Heat            location/position:    Skin assessment post-treatment (if applicable):    [x]  intact    []  redness- no adverse reaction                 []redness adverse reaction:       [x]  Patient Education billed concurrently with other procedures Performed re-certification, updated HEP  [x] Review HEP    [] Progressed/Changed HEP, detail:    [] Other detail:         Other Objective/Functional Measures  See flowsheet for exercises - Performed re-cert      Pain Level at end of session (0-10 scale): 0/10      Assessment   See progress note.   Patient will continue to benefit from skilled PT / OT services to modify and progress therapeutic interventions, analyze and address functional mobility deficits, analyze and address ROM deficits, analyze and address strength deficits, and analyze and address soft tissue restrictions to address functional deficits and attain remaining goals.    Progress toward goals / Updated goals:  []  See Progress Note/Recertification     Short Term Goals: To be accomplished in 6 treatments.  Pt will report compliance with a progressive HEP to assist in rehab progression [] Met [] Not met [x] Partially met  Date:10/21 Inconsistent  Pt will verbalize process and use ice/heat/massage to assist with inflammation and pain management as instructed to prevent pain >6/10 on VAS.[x] Met [] Not met [] Partially met  Date: 9/12/24 9/23 discussed time management for hp and cp  Pt will improve elevation ROM to at least 90 degrees without increased pain to facilitate

## 2024-11-04 NOTE — THERAPY RECERTIFICATION
[facility] [address], [address]  [place] [address]  Ph: [phone]     Fax: [phone]    CONTINUED PLAN OF CARE/RECERTIFICATION FOR PHYSICAL THERAPY          Patient Name:              Brandie Ahumada :  1960   Treatment/Medical Diagnosis:  Chronic right shoulder pain [M25.511, G89.29]   Onset Date:  2024    Referral Source:  Akil Boggs MD Start of Care (SOC):  24   Prior Hospitalization:  See Medical History Provider #:  NPI      Prior Level of Function (PLOF):  Independent   Comorbidities:  DM, lymphedema    Medications:  Verified on Patient Summary List   Visits from SOC:  16 Missed Visits:  0     Progress toward Goals:    Short Term Goals: To be accomplished in 6 treatments.  Pt will report compliance with a progressive HEP to assist in rehab progression [] Met [] Not met [x] Partially met  Date:10/21 Inconsistent  Pt will verbalize process and use ice/heat/massage to assist with inflammation and pain management as instructed to prevent pain >6/10 on VAS.[x] Met [] Not met [] Partially met  Date: 24 discussed time management for hp and cp  Pt will improve elevation ROM to at least 90 degrees without increased pain to facilitate functional reach.[x] Met [] Not met [] Partially met  Date: 10/21  Pt will perform bed mobility/transfers without requiring shoulder positioning in ADD/IR/elbow flexion for pain relief.[x] Met [] Not met [] Partially met  Date: 10/21  Pt will verbalize and use proper positioning for sleeping/resting to help with pain/inflammation and decrease strain on the joint.[] Met [] Not met [x] Partially met  Date: 10/21     Long Term Goals: To be accomplished in 16 treatments.  Pt will improve AmPAC score by 10%.[] Met [x] Not met [] Partially met  Date: 10/21  Pt will demonstrate shoulder elevation ROM >140 degrees without pain to facilitate functional reach.[] Met [x] Not met [] Partially met  Date:

## 2024-11-06 ENCOUNTER — HOSPITAL ENCOUNTER (OUTPATIENT)
Facility: HOSPITAL | Age: 64
Setting detail: RECURRING SERIES
Discharge: HOME | End: 2024-11-09
Payer: MEDICARE

## 2024-11-06 PROCEDURE — G0283 ELEC STIM OTHER THAN WOUND: HCPCS

## 2024-11-06 PROCEDURE — 97110 THERAPEUTIC EXERCISES: CPT

## 2024-11-06 PROCEDURE — 97112 NEUROMUSCULAR REEDUCATION: CPT

## 2024-11-06 NOTE — PROGRESS NOTES
ability to dress herself without pain or difficulty, including reaching behind her back to tuck in shirt.[] Met [] Not met [x] Partially met  Date:10/21 - able to dress self however requires min assist from  (pulling down shirt)       PLAN  Frequency / Duration: Patient to be seen 1-2 times per week for 8 treatments.    Treatment Plan may include any combination of the followin Therapeutic Exercise, 33221 Neuromuscular Re-Education, 42277 Manual Therapy, 97236 Therapeutic Activity, 68937 Self Care/Home Management, 09519 Electrical Stim unattended, 02088 Gait Training, 40745 Ultrasound, and (Elective Self Pay) Needle Insertion w/o Injection (1 or 2 muscles), (3+ muscles)    yes  Continue plan of care  Re-Cert Due: After 8 visits  [x]  Upgrade activities as tolerated  []  Discharge due to:  []  Other:      Wendy Mata, PT       2024       11:39 AM

## 2024-11-13 ENCOUNTER — HOSPITAL ENCOUNTER (OUTPATIENT)
Facility: HOSPITAL | Age: 64
Setting detail: RECURRING SERIES
Discharge: HOME | End: 2024-11-16
Payer: MEDICARE

## 2024-11-13 DIAGNOSIS — E11.65 CONTROLLED TYPE 2 DIABETES MELLITUS WITH HYPERGLYCEMIA, WITHOUT LONG-TERM CURRENT USE OF INSULIN (HCC): ICD-10-CM

## 2024-11-13 PROCEDURE — 97110 THERAPEUTIC EXERCISES: CPT

## 2024-11-13 PROCEDURE — G0283 ELEC STIM OTHER THAN WOUND: HCPCS

## 2024-11-13 NOTE — PROGRESS NOTES
activities interventions . (see flow sheet as applicable)     Details if applicable:  PROM R shoulder; G I/II shoulder joint mobs PA, inferior   15  90742 Neuromuscular Re-Education (timed):  improve balance, coordination, kinesthetic sense, posture, core stability and proprioception to improve patient's ability to develop conscious control of individual muscles and awareness of position of extremities in order to progress to PLOF and address remaining functional goals. (see flow sheet as applicable)    Details if applicable:             45     Total Total         Modalities Rationale:     decrease inflammation, decrease pain, and increase tissue extensibility to improve patient's ability to progress to PLOF and address remaining functional goals.    15   min [x] Estim Unattended,             type/location:R sh       []  w/ice    [x]  w/heat  seated    min  unbilled []  Ice     []  Heat            location/position:    Skin assessment post-treatment (if applicable):    [x]  intact    []  redness- no adverse reaction                 []redness adverse reaction:       [x]  Patient Education billed concurrently with other procedures Performed re-certification, updated HEP  [x] Review HEP    [] Progressed/Changed HEP, detail:    [] Other detail:         Other Objective/Functional Measures  See flowsheet for exercises - increased resistance band with exercises, added triceps and posterior cap stretch.       Pain Level at end of session (0-10 scale): 0/10      Assessment   Patient responded well to treatment session, with no pain at end of session. Patient still needs cues for proper technique  and slow down with exercises. She tends to bend elbow with exercises. Patient has 2 more visits then will transition to HEP. Patient will continue to benefit from skilled PT / OT services to modify and progress therapeutic interventions, analyze and address functional mobility deficits, analyze and address ROM deficits, analyze and

## 2024-11-20 ENCOUNTER — APPOINTMENT (OUTPATIENT)
Facility: HOSPITAL | Age: 64
End: 2024-11-20
Payer: MEDICARE

## 2024-11-21 ENCOUNTER — HOSPITAL ENCOUNTER (OUTPATIENT)
Facility: HOSPITAL | Age: 64
Setting detail: RECURRING SERIES
Discharge: HOME | End: 2024-11-24
Payer: MEDICARE

## 2024-11-21 PROCEDURE — 97110 THERAPEUTIC EXERCISES: CPT

## 2024-11-21 PROCEDURE — G0283 ELEC STIM OTHER THAN WOUND: HCPCS

## 2024-11-21 PROCEDURE — 97140 MANUAL THERAPY 1/> REGIONS: CPT

## 2024-11-21 NOTE — PROGRESS NOTES
pain.[] Met [] Not met [x] Partially met  Date:  Pt will report ability to dress herself without pain or difficulty, including reaching behind her back to tuck in shirt.[] Met [] Not met [x] Partially met  Date:10/21 - able to dress self however requires min assist from  (pulling down shirt)       PLAN  Frequency / Duration: Patient to be seen 1-2 times per week for 8 treatments.    Treatment Plan may include any combination of the followin Therapeutic Exercise, 50595 Neuromuscular Re-Education, 64758 Manual Therapy, 39071 Therapeutic Activity, 12488 Self Care/Home Management, 44788 Electrical Stim unattended, 66019 Gait Training, 07025 Ultrasound, and (Elective Self Pay) Needle Insertion w/o Injection (1 or 2 muscles), (3+ muscles)    yes  Continue plan of care  Re-Cert Due: After 8 visits  [x]  Upgrade activities as tolerated  []  Discharge due to:  []  Other:      Bria Briscoe, PT       2024       10:35 AM

## 2024-11-27 ENCOUNTER — HOSPITAL ENCOUNTER (OUTPATIENT)
Facility: HOSPITAL | Age: 64
Setting detail: RECURRING SERIES
Discharge: HOME | End: 2024-11-30
Payer: MEDICARE

## 2024-11-27 PROCEDURE — G0283 ELEC STIM OTHER THAN WOUND: HCPCS

## 2024-11-27 PROCEDURE — 97110 THERAPEUTIC EXERCISES: CPT

## 2024-11-27 NOTE — PROGRESS NOTES
PHYSICAL THERAPY - MEDICARE DAILY TREATMENT NOTE (updated 3/23)      Date: 2024          Patient Name:  Brandie Ahumada :  1960   Medical   Diagnosis:  Chronic right shoulder pain [M25.511, G89.29] Treatment Diagnosis:  M25.511 RIGHT SHOULDER PAIN    Referral Source:  Akil Boggs MD Insurance:   Payor: MEDICARE / Plan: MEDICARE PART A AND B / Product Type: *No Product type* /                     Patient  verified yes     Visit #   Current  / Total 19 20   Time   In / Out 10:33am 11:27am   Total Treatment Time 54   Total Timed Codes 39   1:1 Treatment Time 39      [facility] Totals Reminder:  bill using total billable   min of TIMED therapeutic procedures and modalities.   8-22 min = 1 unit; 23-37 min = 2 units; 38-52 min = 3 units; 53-67 min = 4 units; 68-82 min = 5 units            SUBJECTIVE    Pain Level (0-10 scale): Current: 0/10   Any medication changes, allergies to medications, adverse drug reactions, diagnosis change, or new procedure performed?: [x] No    [] Yes (see summary sheet for update)  Medications: Verified on Patient Summary List    Subjective functional status/changes:    Pt states she is most comfortable on her sides but the shoulder blade pain has been more intense.  She does pretty well during the day unless she moves the wrong way.  Pt states she is 65-70% improved since starting therapy. She does state she is not consistent with HEP.      OBJECTIVE      Therapeutic Procedures:  Tx Min Billable or 1:1 Min (if diff from Tx Min) Procedure, Rationale, Specifics   45  48232 Therapeutic Exercise (timed):  increase ROM, strength, coordination, balance, and proprioception to improve patient's ability to progress to PLOF and address remaining functional goals. (see flow sheet as applicable)     Details if applicable:       28810 Manual Therapy (timed):  decrease pain, increase ROM, increase tissue extensibility, and decrease trigger points to improve patient's ability to progress

## 2024-12-12 ENCOUNTER — OFFICE VISIT (OUTPATIENT)
Facility: CLINIC | Age: 64
End: 2024-12-12
Payer: MEDICARE

## 2024-12-12 VITALS
HEIGHT: 68 IN | TEMPERATURE: 96.6 F | DIASTOLIC BLOOD PRESSURE: 81 MMHG | WEIGHT: 254 LBS | BODY MASS INDEX: 38.49 KG/M2 | HEART RATE: 82 BPM | SYSTOLIC BLOOD PRESSURE: 140 MMHG | OXYGEN SATURATION: 96 % | RESPIRATION RATE: 20 BRPM

## 2024-12-12 DIAGNOSIS — Z11.59 NEED FOR HEPATITIS C SCREENING TEST: ICD-10-CM

## 2024-12-12 DIAGNOSIS — K75.81 STEATOHEPATITIS: ICD-10-CM

## 2024-12-12 DIAGNOSIS — E55.9 VITAMIN D DEFICIENCY: ICD-10-CM

## 2024-12-12 DIAGNOSIS — Z78.0 POST-MENOPAUSAL: ICD-10-CM

## 2024-12-12 DIAGNOSIS — E11.65 CONTROLLED TYPE 2 DIABETES MELLITUS WITH HYPERGLYCEMIA, WITHOUT LONG-TERM CURRENT USE OF INSULIN (HCC): ICD-10-CM

## 2024-12-12 DIAGNOSIS — M25.511 CHRONIC RIGHT SHOULDER PAIN: Primary | ICD-10-CM

## 2024-12-12 DIAGNOSIS — I89.0 LYMPHATIC EDEMA: ICD-10-CM

## 2024-12-12 DIAGNOSIS — G89.29 CHRONIC RIGHT SHOULDER PAIN: Primary | ICD-10-CM

## 2024-12-12 PROCEDURE — 1036F TOBACCO NON-USER: CPT | Performed by: FAMILY MEDICINE

## 2024-12-12 PROCEDURE — 1090F PRES/ABSN URINE INCON ASSESS: CPT | Performed by: FAMILY MEDICINE

## 2024-12-12 PROCEDURE — G8417 CALC BMI ABV UP PARAM F/U: HCPCS | Performed by: FAMILY MEDICINE

## 2024-12-12 PROCEDURE — 3077F SYST BP >= 140 MM HG: CPT | Performed by: FAMILY MEDICINE

## 2024-12-12 PROCEDURE — G8400 PT W/DXA NO RESULTS DOC: HCPCS | Performed by: FAMILY MEDICINE

## 2024-12-12 PROCEDURE — 3017F COLORECTAL CA SCREEN DOC REV: CPT | Performed by: FAMILY MEDICINE

## 2024-12-12 PROCEDURE — 99214 OFFICE O/P EST MOD 30 MIN: CPT | Performed by: FAMILY MEDICINE

## 2024-12-12 PROCEDURE — 2022F DILAT RTA XM EVC RTNOPTHY: CPT | Performed by: FAMILY MEDICINE

## 2024-12-12 PROCEDURE — 3079F DIAST BP 80-89 MM HG: CPT | Performed by: FAMILY MEDICINE

## 2024-12-12 PROCEDURE — G8484 FLU IMMUNIZE NO ADMIN: HCPCS | Performed by: FAMILY MEDICINE

## 2024-12-12 PROCEDURE — G8428 CUR MEDS NOT DOCUMENT: HCPCS | Performed by: FAMILY MEDICINE

## 2024-12-12 PROCEDURE — 1123F ACP DISCUSS/DSCN MKR DOCD: CPT | Performed by: FAMILY MEDICINE

## 2024-12-12 PROCEDURE — 3051F HG A1C>EQUAL 7.0%<8.0%: CPT | Performed by: FAMILY MEDICINE

## 2024-12-12 NOTE — PROGRESS NOTES
Chief Complaint   Patient presents with    Orders     MRI - right shoulder, pt has been in PT but want to rule out frozen shoulder or torn liagnmet     Diabetes Mellitus     Blood sugars running 190-210  Pt is watching what she eats     Swelling     Bilateral leg swelling- unable to schedule with lymphedema clinic      \"Have you been to the ER, urgent care clinic since your last visit?  Hospitalized since your last visit?\"    NO    “Have you seen or consulted any other health care providers outside of [facility] since your last visit?”    NO            Click Here for Release of Records Request  
elevated, such as when holding a phone, and reports waking up at night due to this sensation. She also reports neck pain and suspects that her symptoms may be related to neuropathy secondary to poorly controlled blood glucose levels.    She has been struggling with fatigue and elevated blood glucose levels, with readings consistently above 200. She has been attempting to manage her diabetes through dietary modifications, including carbohydrate restriction and avoidance of bread, rice, and potatoes. She has been taking Januvia but reports no improvement in her blood glucose levels. She has not been prescribed Mounjaro. She has been taking Probiomax, which initially helped control her blood glucose levels but has since become less effective. She has not been taking any fiber supplements and has been taking magnesium glycinate 400 mg at night, vitamin D, and berberine. She has not taken any antibiotics in the past year due to concerns about potential immune system effects. She has been on glipizide and glimepiride for an extended period. She has not been taking a multivitamin but is open to starting one.    FAMILY HISTORY  The patient reports a familial trait of lymphedema, mentioning that one of her great aunts and one of her cousins have it.    ALLERGIES  The patient is allergic to STEROIDS, which cause her to break out in boils.    MEDICATIONS  Current: Januvia, Probiomax, magnesium glycinate, vitamin D, berberine, glipizide, glimepiride    The following portions of the patient's history were reviewed and updated as appropriate: allergies, current medications, family history, medical history, social history, surgical history and problem list.    BP (!) 140/81   Pulse 82   Temp (!) 96.6 °F (35.9 °C) (Temporal)   Resp 20   Ht 1.727 m (5' 8\")   Wt 115.2 kg (254 lb)   SpO2 96%   BMI 38.62 kg/m²   Physical Exam  Constitutional: well-appearing, no acute distress  Eyes: EOM intact. PERRL bilateral. Not

## 2024-12-12 NOTE — PATIENT INSTRUCTIONS
it's safe and appropriate for your specific health conditions.  - **Progress Gradually:** Start with lighter activities and gradually increase intensity as you build strength and endurance.    This plan aims to balance nutrition and physical activity while accommodating your busy schedule.

## 2025-01-09 ENCOUNTER — PATIENT MESSAGE (OUTPATIENT)
Facility: CLINIC | Age: 65
End: 2025-01-09

## 2025-01-09 DIAGNOSIS — E66.812 CLASS 2 SEVERE OBESITY DUE TO EXCESS CALORIES WITH SERIOUS COMORBIDITY AND BODY MASS INDEX (BMI) OF 35.0 TO 35.9 IN ADULT: ICD-10-CM

## 2025-01-09 DIAGNOSIS — E66.01 CLASS 2 SEVERE OBESITY DUE TO EXCESS CALORIES WITH SERIOUS COMORBIDITY AND BODY MASS INDEX (BMI) OF 35.0 TO 35.9 IN ADULT: ICD-10-CM

## 2025-01-09 DIAGNOSIS — E11.65 CONTROLLED TYPE 2 DIABETES MELLITUS WITH HYPERGLYCEMIA, WITHOUT LONG-TERM CURRENT USE OF INSULIN (HCC): Primary | ICD-10-CM

## 2025-01-09 RX ORDER — TIRZEPATIDE 2.5 MG/.5ML
INJECTION, SOLUTION SUBCUTANEOUS
COMMUNITY
Start: 2024-12-18 | End: 2025-01-09 | Stop reason: ALTCHOICE

## 2025-01-15 ENCOUNTER — TELEPHONE (OUTPATIENT)
Facility: CLINIC | Age: 65
End: 2025-01-15

## 2025-01-15 DIAGNOSIS — E11.65 CONTROLLED TYPE 2 DIABETES MELLITUS WITH HYPERGLYCEMIA, WITHOUT LONG-TERM CURRENT USE OF INSULIN (HCC): Primary | ICD-10-CM

## 2025-01-15 NOTE — TELEPHONE ENCOUNTER
PA for Mounjaro has been approved.     Dear Brandie Ahumada: [facility] has approved a request from you or your doctor for MOUNJARO Soln Auto-inj 5MG/0.5ML. This approval is for 01/15/2025 until further notice. This drug has been approved under the Member's Medicare Part D benefit. Approved quantity: 6 units per 30 day(s). You may fill up to a 90 day supply except for those on Specialty Tier 5    Called [facility] pharmacy.   Pt out of pocket cost will be $698.00.    Pt ask for a new medication to be submitted.     Pt will need a \"regular\" insulin.

## 2025-01-15 NOTE — TELEPHONE ENCOUNTER
I checked other medications of GLP and seems \"coverage\" means still paying $600+ per month, even for the daily meds.    We can start low dose long acting insulin glargine at 10U daily, but will need to make sure patient is checking blood sugars just before bed and first thing in the morning. We could also get her started on the continuous glucose monitors either Kim or Dexcom if she is interested.

## 2025-01-16 RX ORDER — INSULIN GLARGINE 100 [IU]/ML
10 INJECTION, SOLUTION SUBCUTANEOUS NIGHTLY
Qty: 5 ADJUSTABLE DOSE PRE-FILLED PEN SYRINGE | Refills: 2 | Status: SHIPPED | OUTPATIENT
Start: 2025-01-16 | End: 2025-01-16

## 2025-01-16 RX ORDER — BLOOD-GLUCOSE METER
1 KIT MISCELLANEOUS DAILY
Qty: 1 KIT | Refills: 0 | Status: SHIPPED | OUTPATIENT
Start: 2025-01-16

## 2025-01-16 RX ORDER — LANCETS 30 GAUGE
1 EACH MISCELLANEOUS 2 TIMES DAILY
Qty: 200 EACH | Refills: 3 | Status: SHIPPED | OUTPATIENT
Start: 2025-01-16

## 2025-01-16 RX ORDER — GLUCOSAMINE HCL/CHONDROITIN SU 500-400 MG
1 CAPSULE ORAL 2 TIMES DAILY
Qty: 200 STRIP | Refills: 3 | Status: SHIPPED | OUTPATIENT
Start: 2025-01-16

## 2025-01-16 NOTE — TELEPHONE ENCOUNTER
Patient verbalized understanding. She would like a new glucometer kit sent to her pharmacy. She is familiar with both glucometer and insulin use and does not need teaching.     Arnoldo

## 2025-01-16 NOTE — TELEPHONE ENCOUNTER
Pt requesting to speak with nurse she just spoke with regarding Lantus Solostar, states she's having second thoughts, does not want to start insulin, and prefers to take a pill or some other medication.    Pt requests call back at [phone].  Arvind

## 2025-01-17 NOTE — TELEPHONE ENCOUNTER
I have changed to increased Januvia from 50 to 100mg daily. Patient has been informed in separate message. We will not pursue re-addition of insulin at this time.

## 2025-01-20 DIAGNOSIS — E11.65 CONTROLLED TYPE 2 DIABETES MELLITUS WITH HYPERGLYCEMIA, WITHOUT LONG-TERM CURRENT USE OF INSULIN (HCC): ICD-10-CM

## 2025-01-20 NOTE — TELEPHONE ENCOUNTER
Pt called office.     Pt's Januvia was sent to [facility] however pt will like medication to be filled at [facility].     [facility] unable to transfer RX.     Called [facility] pharmacy and Januvia 100 MG has been called in per orders.   To pharmacist, Dax.     Dax ran medication and cost was $673.00.     Pt has been advised of this and asks for medication to be sent to [facility].

## 2025-01-21 LAB
25(OH)D3+25(OH)D2 SERPL-MCNC: 29.1 NG/ML (ref 30–100)
ALBUMIN SERPL-MCNC: 4 G/DL (ref 3.9–4.9)
ALBUMIN/CREAT UR: 8 MG/G CREAT (ref 0–29)
ALP SERPL-CCNC: 132 IU/L (ref 44–121)
ALT SERPL-CCNC: 26 IU/L (ref 0–32)
AST SERPL-CCNC: 22 IU/L (ref 0–40)
BILIRUB SERPL-MCNC: 0.3 MG/DL (ref 0–1.2)
BUN SERPL-MCNC: 19 MG/DL (ref 8–27)
BUN/CREAT SERPL: 28 (ref 12–28)
CALCIUM SERPL-MCNC: 9.4 MG/DL (ref 8.7–10.3)
CHLORIDE SERPL-SCNC: 105 MMOL/L (ref 96–106)
CO2 SERPL-SCNC: 20 MMOL/L (ref 20–29)
CREAT SERPL-MCNC: 0.68 MG/DL (ref 0.57–1)
CREAT UR-MCNC: 169 MG/DL
EGFRCR SERPLBLD CKD-EPI 2021: 97 ML/MIN/1.73
ERYTHROCYTE [DISTWIDTH] IN BLOOD BY AUTOMATED COUNT: 14.9 % (ref 11.7–15.4)
GLOBULIN SER CALC-MCNC: 2.7 G/DL (ref 1.5–4.5)
GLUCOSE SERPL-MCNC: 106 MG/DL (ref 70–99)
HBA1C MFR BLD: 8.7 % (ref 4.8–5.6)
HCT VFR BLD AUTO: 40.6 % (ref 34–46.6)
HGB BLD-MCNC: 12.4 G/DL (ref 11.1–15.9)
MCH RBC QN AUTO: 23.8 PG (ref 26.6–33)
MCHC RBC AUTO-ENTMCNC: 30.5 G/DL (ref 31.5–35.7)
MCV RBC AUTO: 78 FL (ref 79–97)
MICROALBUMIN UR-MCNC: 13.1 UG/ML
PLATELET # BLD AUTO: 270 X10E3/UL (ref 150–450)
POTASSIUM SERPL-SCNC: 3.8 MMOL/L (ref 3.5–5.2)
PROT SERPL-MCNC: 6.7 G/DL (ref 6–8.5)
RBC # BLD AUTO: 5.2 X10E6/UL (ref 3.77–5.28)
SODIUM SERPL-SCNC: 141 MMOL/L (ref 134–144)
TSH SERPL DL<=0.005 MIU/L-ACNC: 1.8 UIU/ML (ref 0.45–4.5)
WBC # BLD AUTO: 9.3 X10E3/UL (ref 3.4–10.8)

## 2025-01-22 LAB
CHOLEST SERPL-MCNC: 145 MG/DL (ref 100–199)
HDL SERPL-SCNC: 36.4 UMOL/L
HDLC SERPL-MCNC: 55 MG/DL
LDL SERPL QN: 20.5 NM
LDL SERPL-SCNC: 692 NMOL/L
LDL SMALL SERPL-SCNC: 244 NMOL/L
LDLC SERPL CALC-MCNC: 67 MG/DL (ref 0–99)
TRIGL SERPL-MCNC: 133 MG/DL (ref 0–149)

## 2025-01-27 LAB — C PEPTIDE SERPL-MCNC: 4.3 NG/ML

## 2025-02-13 ENCOUNTER — OFFICE VISIT (OUTPATIENT)
Facility: CLINIC | Age: 65
End: 2025-02-13

## 2025-02-13 VITALS
WEIGHT: 253 LBS | TEMPERATURE: 96.8 F | HEIGHT: 68 IN | DIASTOLIC BLOOD PRESSURE: 82 MMHG | SYSTOLIC BLOOD PRESSURE: 145 MMHG | OXYGEN SATURATION: 97 % | BODY MASS INDEX: 38.34 KG/M2 | HEART RATE: 91 BPM

## 2025-02-13 DIAGNOSIS — I10 ESSENTIAL HYPERTENSION: ICD-10-CM

## 2025-02-13 DIAGNOSIS — E11.65 CONTROLLED TYPE 2 DIABETES MELLITUS WITH HYPERGLYCEMIA, WITHOUT LONG-TERM CURRENT USE OF INSULIN (HCC): ICD-10-CM

## 2025-02-13 DIAGNOSIS — M75.01 ADHESIVE CAPSULITIS OF RIGHT SHOULDER: ICD-10-CM

## 2025-02-13 DIAGNOSIS — E78.5 DYSLIPIDEMIA: ICD-10-CM

## 2025-02-13 DIAGNOSIS — M25.611 DECREASED RANGE OF MOTION OF RIGHT SHOULDER: ICD-10-CM

## 2025-02-13 DIAGNOSIS — E55.9 VITAMIN D DEFICIENCY: ICD-10-CM

## 2025-02-13 DIAGNOSIS — G89.29 CHRONIC RIGHT SHOULDER PAIN: ICD-10-CM

## 2025-02-13 DIAGNOSIS — Z00.00 WELCOME TO MEDICARE PREVENTIVE VISIT: Primary | ICD-10-CM

## 2025-02-13 DIAGNOSIS — M25.511 CHRONIC RIGHT SHOULDER PAIN: ICD-10-CM

## 2025-02-13 DIAGNOSIS — I89.0 LYMPHATIC EDEMA: ICD-10-CM

## 2025-02-13 SDOH — ECONOMIC STABILITY: FOOD INSECURITY: WITHIN THE PAST 12 MONTHS, THE FOOD YOU BOUGHT JUST DIDN'T LAST AND YOU DIDN'T HAVE MONEY TO GET MORE.: NEVER TRUE

## 2025-02-13 SDOH — ECONOMIC STABILITY: FOOD INSECURITY: WITHIN THE PAST 12 MONTHS, YOU WORRIED THAT YOUR FOOD WOULD RUN OUT BEFORE YOU GOT MONEY TO BUY MORE.: NEVER TRUE

## 2025-02-13 ASSESSMENT — PATIENT HEALTH QUESTIONNAIRE - PHQ9
SUM OF ALL RESPONSES TO PHQ9 QUESTIONS 1 & 2: 0
1. LITTLE INTEREST OR PLEASURE IN DOING THINGS: NOT AT ALL
SUM OF ALL RESPONSES TO PHQ QUESTIONS 1-9: 0
SUM OF ALL RESPONSES TO PHQ QUESTIONS 1-9: 0
2. FEELING DOWN, DEPRESSED OR HOPELESS: NOT AT ALL
SUM OF ALL RESPONSES TO PHQ QUESTIONS 1-9: 0
SUM OF ALL RESPONSES TO PHQ QUESTIONS 1-9: 0

## 2025-02-13 ASSESSMENT — LIFESTYLE VARIABLES: HOW OFTEN DO YOU HAVE A DRINK CONTAINING ALCOHOL: NEVER

## 2025-02-13 NOTE — PROGRESS NOTES
Chief Complaint   Patient presents with    Medicare AWV     Labs drawn 01/2025     \"Have you been to the ER, urgent care clinic since your last visit?  Hospitalized since your last visit?\"    NO    “Have you seen or consulted any other health care providers outside of [facility] since your last visit?”    NO     “Have you had a pap smear?”    NO    Date of last Cervical Cancer screen (HPV or PAP): 2/12/2024             Click Here for Release of Records Request  
(PROBIOTIC BLEND PO) Take by mouth Yes Eros Padilla MD   glimepiride (AMARYL) 4 MG tablet Take 1 tablet by mouth 2 times daily (before meals) Yes Akil Boggs MD   Turmeric (QC TUMERIC COMPLEX PO) Take by mouth Yes Eros Padilla MD   magnesium 30 MG tablet Take 1 tablet by mouth 2 times daily Yes Eros Padilla MD   Cholecalciferol (VITAMIN D) 50 MCG (2000 UT) CAPS capsule Take by mouth Yes Eros Padilla MD   Berberine Chloride (BERBERINE HCI PO) Take by mouth Yes Eros Padilla MD   Omega-3 Fatty Acids (FISH OIL) 300 MG CAPS Take by mouth  Patient not taking: Reported on 2/13/2025  Eros Padilla MD       CareTeam (Including outside providers/suppliers regularly involved in providing care):   Patient Care Team:  Akil Boggs MD as PCP - General (Family Medicine)  Akil Boggs MD as PCP - Empaneled Provider     Recommendations for Preventive Services Due: see orders and patient instructions/AVS.  Recommended screening schedule for the next 5-10 years is provided to the patient in written form: see Patient Instructions/AVS.     Reviewed and updated this visit:  Tobacco  Allergies  Med Hx                   The patient (or guardian, if applicable) and other individuals in attendance with the patient were advised that Artificial Intelligence will be utilized during this visit to record and process the conversation to generate a clinical note. The patient (or guardian, if applicable) and other individuals in attendance at the appointment consented to the use of AI, including the recording.      Total time on the date of encounter exceeded 40 minutes and included patient care, coordination of care, charting and preparation for visit. Time separate from medicare well discussions

## 2025-02-13 NOTE — PATIENT INSTRUCTIONS
Learning About Being Active as an Older Adult  Why is being active important as you get older?     Being active is one of the best things you can do for your health. And it's never too late to start. Being active--or getting active, if you aren't already--has definite benefits. It can:  Give you more energy,  Keep your mind sharp.  Improve balance to reduce your risk of falls.  Help you manage chronic illness with fewer medicines.  No matter how old you are, how fit you are, or what health problems you have, there is a form of activity that will work for you. And the more physical activity you can do, the better your overall health will be.  What kinds of activity can help you stay healthy?  Being more active will make your daily activities easier. Physical activity includes planned exercise and things you do in daily life. There are four types of activity:  Aerobic.  Doing aerobic activity makes your heart and lungs strong.  Includes walking, dancing, and gardening.  Aim for at least 2½ hours spread throughout the week.  It improves your energy and can help you sleep better.  Muscle-strengthening.  This type of activity can help maintain muscle and strengthen bones.  Includes climbing stairs, using resistance bands, and lifting or carrying heavy loads.  Aim for at least twice a week.  It can help protect the knees and other joints.  Stretching.  Stretching gives you better range of motion in joints and muscles.  Includes upper arm stretches, calf stretches, and gentle yoga.  Aim for at least twice a week, preferably after your muscles are warmed up from other activities.  It can help you function better in daily life.  Balancing.  This helps you stay coordinated and have good posture.  Includes heel-to-toe walking, kareem chi, and certain types of yoga.  Aim for at least 3 days a week.  It can reduce your risk of falling.  Even if you have a hard time meeting the recommendations, it's better to be more active

## 2025-03-10 ENCOUNTER — OFFICE VISIT (OUTPATIENT)
Age: 65
End: 2025-03-10

## 2025-03-10 VITALS
WEIGHT: 256 LBS | TEMPERATURE: 98.3 F | RESPIRATION RATE: 17 BRPM | HEART RATE: 85 BPM | OXYGEN SATURATION: 95 % | DIASTOLIC BLOOD PRESSURE: 84 MMHG | SYSTOLIC BLOOD PRESSURE: 154 MMHG | BODY MASS INDEX: 38.92 KG/M2

## 2025-03-10 DIAGNOSIS — H10.31 ACUTE BACTERIAL CONJUNCTIVITIS OF RIGHT EYE: Primary | ICD-10-CM

## 2025-03-10 PROBLEM — E11.42 POLYNEUROPATHY DUE TO TYPE 2 DIABETES MELLITUS (HCC): Status: ACTIVE | Noted: 2025-03-10

## 2025-03-10 PROBLEM — E55.9 VITAMIN D DEFICIENCY: Status: ACTIVE | Noted: 2025-03-10

## 2025-03-10 PROBLEM — M77.41 METATARSALGIA OF RIGHT FOOT: Status: ACTIVE | Noted: 2018-10-08

## 2025-03-10 PROBLEM — E61.1 IRON DEFICIENCY: Status: ACTIVE | Noted: 2025-03-10

## 2025-03-10 PROBLEM — N89.8 VAGINAL DRYNESS: Status: ACTIVE | Noted: 2025-03-10

## 2025-03-10 PROBLEM — R53.83 FATIGUE: Status: ACTIVE | Noted: 2025-03-10

## 2025-03-10 PROBLEM — M54.16 LUMBAR RADICULOPATHY: Status: ACTIVE | Noted: 2019-11-05

## 2025-03-10 PROBLEM — R20.0 NUMBNESS OF HAND: Status: ACTIVE | Noted: 2025-03-10

## 2025-03-10 PROBLEM — I89.0 LYMPHEDEMA OF LOWER EXTREMITY: Status: ACTIVE | Noted: 2025-03-10

## 2025-03-10 RX ORDER — POLYMYXIN B SULFATE AND TRIMETHOPRIM 1; 10000 MG/ML; [USP'U]/ML
1 SOLUTION OPHTHALMIC EVERY 6 HOURS
Qty: 2 ML | Refills: 0 | Status: SHIPPED | OUTPATIENT
Start: 2025-03-10 | End: 2025-03-20

## 2025-03-10 RX ORDER — POLYMYXIN B SULFATE AND TRIMETHOPRIM 1; 10000 MG/ML; [USP'U]/ML
1 SOLUTION OPHTHALMIC EVERY 6 HOURS
Qty: 2 ML | Refills: 0 | Status: SHIPPED | OUTPATIENT
Start: 2025-03-10 | End: 2025-03-10

## 2025-03-10 NOTE — PATIENT INSTRUCTIONS
Thank you for visiting [facility] today.    Simple hygiene measures can help minimize transmission to others:  Cold compresses throughout the day and warm compresses in morning  Artificial tears for dry eye  Adults or children with bacterial or viral conjunctivitis should not share handkerchiefs, tissues, towels, cosmetics or bedsheets/pillows with uninfected family or friends  Hand washing is an essential and highly effective way to prevent the spread of infection.  Hands should be wet with water and plain soap and rubbed together for 15-30 seconds.  It is not necessary to use antibacterial hand soap.  Teach children to wash their hands before and after eating and after touching the eyes, coughing or sneezing  Alcohol based hand rubs are a good alternative for disinfecting hands if a sink is not available.  Hand rubs should be spread over the entire surface of hands, fingers and wrists until dry, and they may be used several times.  These rubs can be used repeatedly without skin irritation or loss of effectiveness  Ophthalmologist if no improvement in 4 days    Please follow up with your PCP as needed.    Go to the ER for worsening or persistent symptoms, changes in vision, severe headache with nausea or pain with eye movement.

## 2025-03-10 NOTE — PROGRESS NOTES
Subjective     Chief Complaint   Patient presents with    Eye Problem     Pt presents today with itchiness, redness, and drainage on R eye x2days        Patient is 64 year old female presenting with right eye itching, redness and discharge.  Symptoms began two days ago.  Has used over the counter eye drops with little relief.   Does not wear contact lenses.          Past Medical History:   Diagnosis Date    Acquired lymphedema of lower extremity     Diabetes mellitus (HCC)     Pap smear for cervical cancer screening 02/12/2024    LSIL; HPV+    Type 2 diabetes mellitus without complication (HCC) >30 years       Past Surgical History:   Procedure Laterality Date    APPENDECTOMY      DILATION AND CURETTAGE  1983    Excessive bleeding    GASTRIC BYPASS SURGERY      KNEE SURGERY      LAPAROSCOPY  2004       Family History   Problem Relation Age of Onset    Heart Failure Mother     Diabetes Mother     Cancer Mother         Cervical    Heart Surgery Mother     Cancer Father     Diabetes Maternal Grandmother     Diabetes Paternal Grandmother     Diabetes Sister        Allergies   Allergen Reactions    Shellfish Allergy Anaphylaxis    Prednisone        Social History     Tobacco Use    Smoking status: Never    Smokeless tobacco: Never   Vaping Use    Vaping status: Never Used   Substance Use Topics    Alcohol use: Not Currently     Comment: Special occasions    Drug use: Not Currently     Types: Marijuana (Weed)     Comment: >30 years ago       Vitals:    03/10/25 1728   BP: (!) 154/84   Pulse: 85   Resp: 17   Temp: 98.3 °F (36.8 °C)   SpO2: 95%       Objective     Physical Exam  Vitals and nursing note reviewed.   Constitutional:       General: She is not in acute distress.     Appearance: Normal appearance. She is not ill-appearing.   HENT:      Head: Normocephalic and atraumatic.   Eyes:      General: Lids are normal.         Right eye: No discharge.      Extraocular Movements: Extraocular movements intact.

## 2025-03-19 ENCOUNTER — TELEPHONE (OUTPATIENT)
Facility: CLINIC | Age: 65
End: 2025-03-19

## 2025-03-19 DIAGNOSIS — I89.0 LYMPHATIC EDEMA: Primary | ICD-10-CM

## 2025-03-19 NOTE — TELEPHONE ENCOUNTER
Pt requesting new referral for Lymphedema Clinic ([facility], [facility]) from Dr. Boggs, states she was not able to schedule with [place] location due to distance from her home and lack of available appointments.     Pt also states her podiatrist's office has not been cooperative with sending notes or completing documents requested.    Pt asking if new referral can be faxed to [phone].      Documents received from [facility] and placed in provider's bin up front for nurse. Arvind

## 2025-04-23 ENCOUNTER — TRANSCRIBE ORDERS (OUTPATIENT)
Facility: HOSPITAL | Age: 65
End: 2025-04-23

## 2025-04-23 DIAGNOSIS — Z12.31 OTHER SCREENING MAMMOGRAM: Primary | ICD-10-CM

## 2025-04-25 DIAGNOSIS — E11.65 CONTROLLED TYPE 2 DIABETES MELLITUS WITH HYPERGLYCEMIA, WITHOUT LONG-TERM CURRENT USE OF INSULIN (HCC): ICD-10-CM

## 2025-04-25 RX ORDER — GLIMEPIRIDE 4 MG/1
4 TABLET ORAL
Qty: 90 TABLET | Refills: 3 | Status: SHIPPED | OUTPATIENT
Start: 2025-04-25

## 2025-05-13 DIAGNOSIS — E78.5 DYSLIPIDEMIA: ICD-10-CM

## 2025-05-13 DIAGNOSIS — E55.9 VITAMIN D DEFICIENCY: ICD-10-CM

## 2025-05-13 DIAGNOSIS — I10 ESSENTIAL HYPERTENSION: ICD-10-CM

## 2025-05-13 DIAGNOSIS — E11.65 CONTROLLED TYPE 2 DIABETES MELLITUS WITH HYPERGLYCEMIA, WITHOUT LONG-TERM CURRENT USE OF INSULIN (HCC): ICD-10-CM

## 2025-06-06 LAB
ERYTHROCYTE [DISTWIDTH] IN BLOOD BY AUTOMATED COUNT: 16.6 % (ref 11.7–15.4)
HBA1C MFR BLD: 11.2 % (ref 4.8–5.6)
HCT VFR BLD AUTO: 39.3 % (ref 34–46.6)
HGB BLD-MCNC: 11.4 G/DL (ref 11.1–15.9)
MCH RBC QN AUTO: 22 PG (ref 26.6–33)
MCHC RBC AUTO-ENTMCNC: 29 G/DL (ref 31.5–35.7)
MCV RBC AUTO: 76 FL (ref 79–97)
PLATELET # BLD AUTO: 281 X10E3/UL (ref 150–450)
RBC # BLD AUTO: 5.19 X10E6/UL (ref 3.77–5.28)
WBC # BLD AUTO: 5 X10E3/UL (ref 3.4–10.8)

## 2025-06-07 LAB
25(OH)D3+25(OH)D2 SERPL-MCNC: 26.6 NG/ML (ref 30–100)
ALBUMIN SERPL-MCNC: 4 G/DL (ref 3.9–4.9)
ALP SERPL-CCNC: 166 IU/L (ref 44–121)
ALT SERPL-CCNC: 43 IU/L (ref 0–32)
AST SERPL-CCNC: 29 IU/L (ref 0–40)
BILIRUB SERPL-MCNC: 0.2 MG/DL (ref 0–1.2)
BUN SERPL-MCNC: 13 MG/DL (ref 8–27)
BUN/CREAT SERPL: 19 (ref 12–28)
CALCIUM SERPL-MCNC: 9.2 MG/DL (ref 8.7–10.3)
CHLORIDE SERPL-SCNC: 100 MMOL/L (ref 96–106)
CHOLEST SERPL-MCNC: 152 MG/DL (ref 100–199)
CO2 SERPL-SCNC: 23 MMOL/L (ref 20–29)
CREAT SERPL-MCNC: 0.69 MG/DL (ref 0.57–1)
EGFRCR SERPLBLD CKD-EPI 2021: 96 ML/MIN/1.73
GLOBULIN SER CALC-MCNC: 2.6 G/DL (ref 1.5–4.5)
GLUCOSE SERPL-MCNC: 242 MG/DL (ref 70–99)
HDL SERPL-SCNC: 39.4 UMOL/L
HDLC SERPL-MCNC: 54 MG/DL
IMP & REVIEW OF LAB RESULTS: NORMAL
LDL SERPL QN: 20.7 NM
LDL SERPL-SCNC: 769 NMOL/L
LDL SMALL SERPL-SCNC: 360 NMOL/L
LDLC SERPL CALC-MCNC: 70 MG/DL (ref 0–99)
LP-IR SCORE SERPL: 50
Lab: NORMAL
POTASSIUM SERPL-SCNC: 4.3 MMOL/L (ref 3.5–5.2)
PROT SERPL-MCNC: 6.6 G/DL (ref 6–8.5)
SODIUM SERPL-SCNC: 139 MMOL/L (ref 134–144)
TRIGL SERPL-MCNC: 164 MG/DL (ref 0–149)

## 2025-06-18 ENCOUNTER — RESULTS FOLLOW-UP (OUTPATIENT)
Facility: CLINIC | Age: 65
End: 2025-06-18

## 2025-07-03 DIAGNOSIS — E11.65 CONTROLLED TYPE 2 DIABETES MELLITUS WITH HYPERGLYCEMIA, WITHOUT LONG-TERM CURRENT USE OF INSULIN (HCC): ICD-10-CM

## 2025-07-03 RX ORDER — SITAGLIPTIN 100 MG/1
100 TABLET, FILM COATED ORAL DAILY
Qty: 90 TABLET | Refills: 3 | Status: SHIPPED | OUTPATIENT
Start: 2025-07-03

## 2025-07-11 DIAGNOSIS — E11.65 CONTROLLED TYPE 2 DIABETES MELLITUS WITH HYPERGLYCEMIA, WITHOUT LONG-TERM CURRENT USE OF INSULIN (HCC): ICD-10-CM

## 2025-07-11 RX ORDER — GLIMEPIRIDE 4 MG/1
4 TABLET ORAL 2 TIMES DAILY
Qty: 180 TABLET | Refills: 0 | Status: SHIPPED | OUTPATIENT
Start: 2025-07-11 | End: 2025-10-09

## 2025-07-11 NOTE — TELEPHONE ENCOUNTER
Patient is out of town ([place]) she is requesting the GLIMEPIRIDE to be refilled at twice per day. Stated the labs have been done and A1c. She is going to call office when she gets back in town.     [phone]

## 2025-07-11 NOTE — TELEPHONE ENCOUNTER
Patient is aware she needs a T2DM follow up with PCP. [place] is [place] and will call back to schedule when she is back in [place].     Asking for a refill of glimepiride in the meantime    Arnoldo

## 2025-07-21 ENCOUNTER — TELEPHONE (OUTPATIENT)
Facility: CLINIC | Age: 65
End: 2025-07-21

## 2025-07-29 ENCOUNTER — TELEMEDICINE (OUTPATIENT)
Facility: CLINIC | Age: 65
End: 2025-07-29
Payer: MEDICARE

## 2025-07-29 DIAGNOSIS — E55.9 VITAMIN D DEFICIENCY: Primary | ICD-10-CM

## 2025-07-29 DIAGNOSIS — E11.65 TYPE 2 DIABETES MELLITUS WITH HYPERGLYCEMIA, WITH LONG-TERM CURRENT USE OF INSULIN (HCC): ICD-10-CM

## 2025-07-29 DIAGNOSIS — Z79.4 TYPE 2 DIABETES MELLITUS WITH HYPERGLYCEMIA, WITH LONG-TERM CURRENT USE OF INSULIN (HCC): ICD-10-CM

## 2025-07-29 PROCEDURE — 3046F HEMOGLOBIN A1C LEVEL >9.0%: CPT

## 2025-07-29 PROCEDURE — 1090F PRES/ABSN URINE INCON ASSESS: CPT

## 2025-07-29 PROCEDURE — G8427 DOCREV CUR MEDS BY ELIG CLIN: HCPCS

## 2025-07-29 PROCEDURE — 1036F TOBACCO NON-USER: CPT

## 2025-07-29 PROCEDURE — 1123F ACP DISCUSS/DSCN MKR DOCD: CPT

## 2025-07-29 PROCEDURE — G8400 PT W/DXA NO RESULTS DOC: HCPCS

## 2025-07-29 PROCEDURE — 2022F DILAT RTA XM EVC RTNOPTHY: CPT

## 2025-07-29 PROCEDURE — 99214 OFFICE O/P EST MOD 30 MIN: CPT

## 2025-07-29 PROCEDURE — G8417 CALC BMI ABV UP PARAM F/U: HCPCS

## 2025-07-29 PROCEDURE — 3017F COLORECTAL CA SCREEN DOC REV: CPT

## 2025-07-29 RX ORDER — INSULIN GLARGINE 100 [IU]/ML
10 INJECTION, SOLUTION SUBCUTANEOUS NIGHTLY
Qty: 3 ML | Refills: 3 | Status: SHIPPED | OUTPATIENT
Start: 2025-07-29

## 2025-07-29 NOTE — PROGRESS NOTES
Brandie Ahumdaa, was evaluated through a synchronous (real-time) audio-video encounter. The patient (or guardian if applicable) is aware that this is a billable service, which includes applicable co-pays. This Virtual Visit was conducted with patient's (and/or legal guardian's) consent. Patient identification was verified, and a caregiver was present when appropriate.   The patient was located at Home: [address] [place] [id number]  Provider was located at Facility (Appt Dept): [address] [facility]  [address]  [place] [address]  Confirm you are appropriately licensed, registered, or certified to deliver care in the state where the patient is located as indicated above. If you are not or unsure, please re-schedule the visit: Yes, I confirm.     Brandie Ahumada (: 1960) is a Established patient, presenting virtually for evaluation of the following:    ASSESSMENT & PLAN:  Below is the assessment and plan developed based on review of pertinent history, physical exam, labs, studies, and medications.  Assessment & Plan  1. Diabetes mellitus.  A1c level has escalated to 11.2, indicating poorly controlled diabetes. Blood glucose readings consistently in the 230s align with this A1c level.  Continue glimipiride and januvia as prescribed.  Metformin gave her GI upset and jardiance was too pricey. A prescription for Lantus 10 units at bedtime has been provided. She will monitor her blood glucose levels and report back within the next 1 to 2 weeks. Has follow up scheduled.  Orders:    insulin glargine (LANTUS SOLOSTAR) 100 UNIT/ML injection pen; Inject 10 Units into the skin nightly    2. Vitamin D deficiency.  Vitamin D level is slightly below the desired range at 26.6. Hemoglobin levels remain high, indicating adequate iron levels and ruling out iron deficiency as a cause of her symptoms. Advised to take an over-the-counter vitamin D supplement of 600 or 800 units at night. A prescription

## 2025-07-30 ENCOUNTER — TELEPHONE (OUTPATIENT)
Facility: CLINIC | Age: 65
End: 2025-07-30

## 2025-07-30 NOTE — TELEPHONE ENCOUNTER
Spoke with rep at [facility], whom stated I need to call this number to process PA  [phone]

## 2025-07-30 NOTE — TELEPHONE ENCOUNTER
Patient said her insurance is requiring a PA for her Lantus Solostar insulin.       [facility]   [phone]

## 2025-08-13 ENCOUNTER — PATIENT MESSAGE (OUTPATIENT)
Facility: CLINIC | Age: 65
End: 2025-08-13

## 2025-08-14 RX ORDER — INSULIN GLARGINE 100 [IU]/ML
10 INJECTION, SOLUTION SUBCUTANEOUS NIGHTLY
Qty: 10 ML | Refills: 3 | Status: SHIPPED | OUTPATIENT
Start: 2025-08-14

## 2025-08-19 ENCOUNTER — HOSPITAL ENCOUNTER (OUTPATIENT)
Facility: HOSPITAL | Age: 65
Discharge: HOME | End: 2025-08-22
Payer: MEDICARE

## 2025-08-19 DIAGNOSIS — Z12.31 OTHER SCREENING MAMMOGRAM: ICD-10-CM

## 2025-08-19 PROCEDURE — 77063 BREAST TOMOSYNTHESIS BI: CPT

## 2025-09-05 RX ORDER — INSULIN GLARGINE 100 [IU]/ML
10 INJECTION, SOLUTION SUBCUTANEOUS NIGHTLY
Qty: 10 ML | Refills: 3 | Status: SHIPPED | OUTPATIENT
Start: 2025-09-05